# Patient Record
Sex: MALE | Race: WHITE | NOT HISPANIC OR LATINO | Employment: OTHER | URBAN - METROPOLITAN AREA
[De-identification: names, ages, dates, MRNs, and addresses within clinical notes are randomized per-mention and may not be internally consistent; named-entity substitution may affect disease eponyms.]

---

## 2023-01-20 ENCOUNTER — TELEPHONE (OUTPATIENT)
Dept: HEPATOLOGY | Facility: CLINIC | Age: 68
End: 2023-01-20

## 2023-01-20 NOTE — TELEPHONE ENCOUNTER
Spoke with patient wife, Alla, to get patient scheduled for an appointment with Dr. Lisa Jones. Wife requested to call her back on Wednesday, 01/25/2023 to schedule an appointment.     She states that the patient is scheduled for a heat ablation on Monday, 01/23, and would like to get that out of the way first.     Verbalized understanding with wife and will call back next week to schedule an appointment.

## 2023-02-10 ENCOUNTER — TELEPHONE (OUTPATIENT)
Dept: HEPATOLOGY | Facility: CLINIC | Age: 68
End: 2023-02-10

## 2023-02-10 NOTE — TELEPHONE ENCOUNTER
Spoke with patient and offered to schedule an appointment. Patient states that he needs to speak with his wife this evening and call back to schedule.     Verbalized understanding with patient.

## 2023-02-21 ENCOUNTER — TELEPHONE (OUTPATIENT)
Dept: HEPATOLOGY | Facility: CLINIC | Age: 68
End: 2023-02-21
Payer: MEDICARE

## 2023-02-21 NOTE — TELEPHONE ENCOUNTER
Spoke with patient and his wife and scheduled an appointment for 2/27/2023 with Dr. Lisa Jones at 0800.

## 2023-04-19 ENCOUNTER — OFFICE VISIT (OUTPATIENT)
Dept: HEPATOLOGY | Facility: CLINIC | Age: 68
End: 2023-04-19
Payer: MEDICARE

## 2023-04-19 VITALS — WEIGHT: 211.44 LBS | SYSTOLIC BLOOD PRESSURE: 142 MMHG | HEART RATE: 64 BPM | DIASTOLIC BLOOD PRESSURE: 72 MMHG

## 2023-04-19 DIAGNOSIS — D84.9 IMMUNOSUPPRESSION: Primary | ICD-10-CM

## 2023-04-19 DIAGNOSIS — Z94.4 LIVER TRANSPLANTED: ICD-10-CM

## 2023-04-19 PROCEDURE — 99204 OFFICE O/P NEW MOD 45 MIN: CPT | Mod: S$PBB,,, | Performed by: INTERNAL MEDICINE

## 2023-04-19 PROCEDURE — 99999 PR PBB SHADOW E&M-EST. PATIENT-LVL IV: CPT | Mod: PBBFAC,,, | Performed by: INTERNAL MEDICINE

## 2023-04-19 PROCEDURE — 99999 PR PBB SHADOW E&M-EST. PATIENT-LVL IV: ICD-10-PCS | Mod: PBBFAC,,, | Performed by: INTERNAL MEDICINE

## 2023-04-19 PROCEDURE — 99204 PR OFFICE/OUTPT VISIT, NEW, LEVL IV, 45-59 MIN: ICD-10-PCS | Mod: S$PBB,,, | Performed by: INTERNAL MEDICINE

## 2023-04-19 PROCEDURE — 99214 OFFICE O/P EST MOD 30 MIN: CPT | Mod: PBBFAC | Performed by: INTERNAL MEDICINE

## 2023-04-19 RX ORDER — SULFAMETHOXAZOLE AND TRIMETHOPRIM 800; 160 MG/1; MG/1
1 TABLET ORAL 2 TIMES DAILY
COMMUNITY
End: 2023-04-19 | Stop reason: ALTCHOICE

## 2023-04-19 RX ORDER — MELOXICAM 15 MG/1
TABLET ORAL
COMMUNITY
Start: 2023-02-17

## 2023-04-19 RX ORDER — AMIODARONE HYDROCHLORIDE 200 MG/1
400 TABLET ORAL
COMMUNITY

## 2023-04-19 RX ORDER — AMLODIPINE BESYLATE 5 MG/1
TABLET ORAL
COMMUNITY
Start: 2023-03-22

## 2023-04-19 RX ORDER — METOPROLOL SUCCINATE 25 MG/1
TABLET, EXTENDED RELEASE ORAL
COMMUNITY
Start: 2023-03-20

## 2023-04-19 RX ORDER — AMITRIPTYLINE HYDROCHLORIDE 75 MG/1
TABLET ORAL
COMMUNITY
Start: 2023-03-15

## 2023-04-19 RX ORDER — HYDROCODONE BITARTRATE AND ACETAMINOPHEN 10; 325 MG/1; MG/1
TABLET ORAL
COMMUNITY

## 2023-04-19 RX ORDER — APIXABAN 5 MG/1
TABLET, FILM COATED ORAL
COMMUNITY
Start: 2022-10-24

## 2023-04-19 RX ORDER — FLUTICASONE PROPIONATE 50 MCG
SPRAY, SUSPENSION (ML) NASAL
COMMUNITY
Start: 2023-03-15

## 2023-04-19 RX ORDER — PANTOPRAZOLE SODIUM 40 MG/1
TABLET, DELAYED RELEASE ORAL
COMMUNITY
Start: 2023-03-20

## 2023-04-19 RX ORDER — PRAVASTATIN SODIUM 20 MG/1
TABLET ORAL
COMMUNITY
Start: 2023-03-20

## 2023-04-19 RX ORDER — TACROLIMUS 1 MG/1
1 CAPSULE ORAL
COMMUNITY

## 2023-04-19 NOTE — PROGRESS NOTES
Subjective:     Calixto Navarro Jr. is here for evaluation of Liver Transplant Follow-up      HPI  Calixto Navarro Jr. to establish care with a new transplant center.  He is not been seen regularly for transplant in many years.  He has also been decreasing his immunosuppression on his own and for at least 3-4 years has only been taking 1 mg of tacrolimus 3 times a week.  He also for some reason has been on Bactrim for many years.  Denies any history of infections.  He does report he gets his labs every 3 months denies any significant issues.  Overall he has been doing well and feeling well.  He retired in 2019.  He reports after transplant he had to have 3 other surgeries very soon after his transplant but after that no major issues.  No significant problems with rejection.    He does see a dermatologist every 6 months.    S/p liver transplant at [facility] 8/2001    Review of Systems    Objective:     Physical Exam  Vitals reviewed.   Constitutional:       General: He is not in acute distress.     Appearance: He is well-developed.   HENT:      Head: Normocephalic and atraumatic.      Mouth/Throat:      Pharynx: No oropharyngeal exudate.   Eyes:      General: No scleral icterus.        Right eye: No discharge.         Left eye: No discharge.      Conjunctiva/sclera: Conjunctivae normal.      Pupils: Pupils are equal, round, and reactive to light.   Pulmonary:      Effort: Pulmonary effort is normal. No respiratory distress.      Breath sounds: Normal breath sounds. No wheezing.   Abdominal:      General: There is no distension.      Palpations: Abdomen is soft.      Tenderness: There is no abdominal tenderness.   Neurological:      Mental Status: He is alert and oriented to person, place, and time.   Psychiatric:         Behavior: Behavior normal.       Computed MELD-Na score unavailable. Necessary lab results were not found in the last year.  Computed MELD score unavailable. Necessary lab results were not found  in the last year.    No results found for: WBC, HGB, HCT, PLT  No results found for: BUN, CREATININE, GLU, CALCIUM, NA, K, CL, MG  No results found for: AST, ALT, ALKPHOS, BILITOT, ALBUMIN  No results found for: INR      Assessment/Plan:     1. Immunosuppression    2. Liver transplanted      Calixto Navarro Jr. is a 67 y.o. male withLiver Transplant Follow-up    Immunosuppression-expressed with patient and wife the unusual nature of his tacrolimus level.  They have been very adamant about decreasing his immunosuppression and his wife has 1 him to stop immunosuppression altogether.  I did explain that I do not advise stopping immunosuppression as he would be at risk of rejection.  Given that he has been apparently stable with his current dosing of tacrolimus will continue this for now.  He does get labs every 3 months by another provider.  Wife reports that she will have these faxed to us for review.  -need to see recent transplant labs  -     Comprehensive Metabolic Panel; Standing  -     CBC Auto Differential; Standing  -     Tacrolimus Level; Standing  -     US Doppler Liver Transplant Post (xpd); Future; Expected date: 04/19/2023    Liver transplanted-appears to have good allograft function  -would like to get updated imaging and staging given how far patient's out from transplant  -continue with yearly dermatologic follow-up  -I will continue to follow this patient and I do not think there is any benefit at this time to transferring her to the transplant service  -discontinue Bactrim as is no indication and concerned about risk of resistance among other complications  -     US Elastography Liver w/imaging; Future; Expected date: 04/19/2023  -     Comprehensive Metabolic Panel; Standing  -     CBC Auto Differential; Standing  -     Tacrolimus Level; Standing  -     US Doppler Liver Transplant Post (xpd); Future; Expected date: 04/19/2023      Return to clinic in 1 year    Lisa Jones MD     Addendum      Outside records from February 2023 show white blood cell count 7.3, hemoglobin 14.2, platelet count 232, creatinine 0.88, sodium 142, potassium 4.2, albumin 4.4, AST 28, ALT 34, total cholesterol 147, triglycerides 90, hemoglobin A1c 5.9, tacrolimus level 7.7

## 2023-05-11 ENCOUNTER — HOSPITAL ENCOUNTER (OUTPATIENT)
Dept: RADIOLOGY | Facility: HOSPITAL | Age: 68
Discharge: HOME | End: 2023-05-11
Attending: INTERNAL MEDICINE
Payer: MEDICARE

## 2023-05-11 ENCOUNTER — PROCEDURE VISIT (OUTPATIENT)
Dept: HEPATOLOGY | Facility: CLINIC | Age: 68
End: 2023-05-11
Attending: INTERNAL MEDICINE
Payer: MEDICARE

## 2023-05-11 VITALS — WEIGHT: 214.5 LBS | HEIGHT: 77 IN | BODY MASS INDEX: 25.33 KG/M2

## 2023-05-11 DIAGNOSIS — Z94.4 LIVER TRANSPLANTED: ICD-10-CM

## 2023-05-11 DIAGNOSIS — D84.9 IMMUNOSUPPRESSION: ICD-10-CM

## 2023-05-11 PROCEDURE — 76705 ECHO EXAM OF ABDOMEN: CPT | Mod: 26,XS,, | Performed by: RADIOLOGY

## 2023-05-11 PROCEDURE — 93976 VASCULAR STUDY: CPT | Mod: TC

## 2023-05-11 PROCEDURE — 76981 PR US, ELASTOGRAPHY, PARENCHYMA: ICD-10-PCS | Mod: 26,S$PBB,, | Performed by: NURSE PRACTITIONER

## 2023-05-11 PROCEDURE — 76981 USE PARENCHYMA: CPT | Mod: 26,S$PBB,, | Performed by: NURSE PRACTITIONER

## 2023-05-11 PROCEDURE — 76981 USE PARENCHYMA: CPT | Mod: PBBFAC | Performed by: NURSE PRACTITIONER

## 2023-05-11 PROCEDURE — 93976 VASCULAR STUDY: CPT | Mod: 26,,, | Performed by: RADIOLOGY

## 2023-05-11 PROCEDURE — 76705 US DOPPLER LIVER TRANSPLANT POST (XPD): ICD-10-PCS | Mod: 26,XS,, | Performed by: RADIOLOGY

## 2023-05-11 PROCEDURE — 93976 US DOPPLER LIVER TRANSPLANT POST (XPD): ICD-10-PCS | Mod: 26,,, | Performed by: RADIOLOGY

## 2023-05-11 NOTE — PROGRESS NOTES
Patient presented in clinic today for fibroscan examination of their liver. Patient verbalized that they have fasted 4 hours prior to their examination. Patient denies having any active implantable metal devices; such as a pacemaker, defibrillator, or pump.     LYNN FonsecaN, RN   Registered Nurse Lead- Hepatology   [facility] [place]

## 2023-05-11 NOTE — PROCEDURES
Fibroscan Procedure     Name: Calixto Navarro   Date of Procedure : 2023   :: SHREYAS Delgado  Diagnosis: Transplant    Probe: M    Fibroscan readin.8 KPa    Fibrosis:F 0-1     CAP readin dB/m    Steatosis: :S2       Interpretation:   Moderate steatosis without fibrosis

## 2024-07-18 ENCOUNTER — HOSPITAL ENCOUNTER (INPATIENT)
Facility: HOSPITAL | Age: 69
LOS: 3 days | Discharge: HOME | DRG: 389 | End: 2024-07-21
Attending: EMERGENCY MEDICINE | Admitting: STUDENT IN AN ORGANIZED HEALTH CARE EDUCATION/TRAINING PROGRAM
Payer: MEDICARE

## 2024-07-18 DIAGNOSIS — R00.0 TACHYCARDIA: ICD-10-CM

## 2024-07-18 DIAGNOSIS — K56.609 SBO (SMALL BOWEL OBSTRUCTION): ICD-10-CM

## 2024-07-18 DIAGNOSIS — K56.609 SMALL BOWEL OBSTRUCTION: ICD-10-CM

## 2024-07-18 DIAGNOSIS — R57.9 SHOCK CIRCULATORY: ICD-10-CM

## 2024-07-18 DIAGNOSIS — Z94.4 S/P LIVER TRANSPLANT: Primary | Chronic | ICD-10-CM

## 2024-07-18 PROBLEM — I48.0 PAROXYSMAL A-FIB: Status: ACTIVE | Noted: 2024-07-18

## 2024-07-18 LAB
ABO + RH BLD: NORMAL
ALBUMIN SERPL BCP-MCNC: 2.1 G/DL (ref 3.5–5.2)
ALBUMIN SERPL BCP-MCNC: 2.3 G/DL (ref 3.5–5.2)
ALP SERPL-CCNC: 216 U/L (ref 55–135)
ALP SERPL-CCNC: 243 U/L (ref 55–135)
ALT SERPL W/O P-5'-P-CCNC: 79 U/L (ref 10–44)
ALT SERPL W/O P-5'-P-CCNC: 89 U/L (ref 10–44)
ANION GAP SERPL CALC-SCNC: 13 MMOL/L (ref 8–16)
ANION GAP SERPL CALC-SCNC: 14 MMOL/L (ref 8–16)
ASCENDING AORTA: 3.1 CM
AST SERPL-CCNC: 65 U/L (ref 10–40)
AST SERPL-CCNC: 74 U/L (ref 10–40)
AV INDEX (PROSTH): 0.48
AV MEAN GRADIENT: 10 MMHG
AV PEAK GRADIENT: 15 MMHG
AV VALVE AREA BY VELOCITY RATIO: 1.39 CM²
AV VALVE AREA: 1.49 CM²
AV VELOCITY RATIO: 0.44
BASOPHILS # BLD AUTO: 0.01 K/UL (ref 0–0.2)
BASOPHILS # BLD AUTO: 0.09 K/UL (ref 0–0.2)
BASOPHILS NFR BLD: 0.1 % (ref 0–1.9)
BASOPHILS NFR BLD: 0.5 % (ref 0–1.9)
BILIRUB SERPL-MCNC: 1.3 MG/DL (ref 0.1–1)
BILIRUB SERPL-MCNC: 1.6 MG/DL (ref 0.1–1)
BLD GP AB SCN CELLS X3 SERPL QL: NORMAL
BSA FOR ECHO PROCEDURE: 2.24 M2
BUN SERPL-MCNC: 41 MG/DL (ref 8–23)
BUN SERPL-MCNC: 42 MG/DL (ref 8–23)
CA-I BLDV-SCNC: 1.02 MMOL/L (ref 1.06–1.42)
CALCIUM SERPL-MCNC: 8.5 MG/DL (ref 8.7–10.5)
CALCIUM SERPL-MCNC: 8.5 MG/DL (ref 8.7–10.5)
CHLORIDE SERPL-SCNC: 97 MMOL/L (ref 95–110)
CHLORIDE SERPL-SCNC: 98 MMOL/L (ref 95–110)
CO2 SERPL-SCNC: 18 MMOL/L (ref 23–29)
CO2 SERPL-SCNC: 20 MMOL/L (ref 23–29)
CREAT SERPL-MCNC: 2 MG/DL (ref 0.5–1.4)
CREAT SERPL-MCNC: 2.5 MG/DL (ref 0.5–1.4)
CV ECHO LV RWT: 0.43 CM
DIFFERENTIAL METHOD BLD: ABNORMAL
DIFFERENTIAL METHOD BLD: ABNORMAL
DOP CALC AO PEAK VEL: 1.96 M/S
DOP CALC AO VTI: 30.29 CM
DOP CALC LVOT AREA: 3.1 CM2
DOP CALC LVOT DIAMETER: 2 CM
DOP CALC LVOT PEAK VEL: 0.87 M/S
DOP CALC LVOT STROKE VOLUME: 45.18 CM3
DOP CALC MV VTI: 38.16 CM
DOP CALCLVOT PEAK VEL VTI: 14.39 CM
E WAVE DECELERATION TIME: 453.51 MSEC
E/A RATIO: 1.15
E/E' RATIO: 18.47 M/S
ECHO LV POSTERIOR WALL: 1.12 CM (ref 0.6–1.1)
EOSINOPHIL # BLD AUTO: 0 K/UL (ref 0–0.5)
EOSINOPHIL # BLD AUTO: 0.1 K/UL (ref 0–0.5)
EOSINOPHIL NFR BLD: 0.2 % (ref 0–8)
EOSINOPHIL NFR BLD: 0.4 % (ref 0–8)
ERYTHROCYTE [DISTWIDTH] IN BLOOD BY AUTOMATED COUNT: 13.6 % (ref 11.5–14.5)
ERYTHROCYTE [DISTWIDTH] IN BLOOD BY AUTOMATED COUNT: 13.8 % (ref 11.5–14.5)
EST. GFR  (NO RACE VARIABLE): 27.5 ML/MIN/1.73 M^2
EST. GFR  (NO RACE VARIABLE): 35.9 ML/MIN/1.73 M^2
FRACTIONAL SHORTENING: 43 % (ref 28–44)
GLUCOSE SERPL-MCNC: 102 MG/DL (ref 70–110)
GLUCOSE SERPL-MCNC: 105 MG/DL (ref 70–110)
HCT VFR BLD AUTO: 36 % (ref 40–54)
HCT VFR BLD AUTO: 40.6 % (ref 40–54)
HGB BLD-MCNC: 11.9 G/DL (ref 14–18)
HGB BLD-MCNC: 13.2 G/DL (ref 14–18)
HR MV ECHO: 102 BPM
IMM GRANULOCYTES # BLD AUTO: 0.48 K/UL (ref 0–0.04)
IMM GRANULOCYTES # BLD AUTO: 0.74 K/UL (ref 0–0.04)
IMM GRANULOCYTES NFR BLD AUTO: 2.8 % (ref 0–0.5)
IMM GRANULOCYTES NFR BLD AUTO: 4.9 % (ref 0–0.5)
INR PPP: 1.4 (ref 0.8–1.2)
INTERVENTRICULAR SEPTUM: 1.24 CM (ref 0.6–1.1)
LA MAJOR: 6.2 CM
LA WIDTH: 3.62 CM
LACTATE SERPL-SCNC: 1.9 MMOL/L (ref 0.5–2.2)
LEFT ATRIUM SIZE: 5.23 CM
LEFT INTERNAL DIMENSION IN SYSTOLE: 2.94 CM (ref 2.1–4)
LEFT VENTRICLE DIASTOLIC VOLUME INDEX: 57.77 ML/M2
LEFT VENTRICLE DIASTOLIC VOLUME: 127.67 ML
LEFT VENTRICLE MASS INDEX: 109 G/M2
LEFT VENTRICLE SYSTOLIC VOLUME INDEX: 15.1 ML/M2
LEFT VENTRICLE SYSTOLIC VOLUME: 33.39 ML
LEFT VENTRICULAR INTERNAL DIMENSION IN DIASTOLE: 5.17 CM (ref 3.5–6)
LEFT VENTRICULAR MASS: 240.86 G
LV LATERAL E/E' RATIO: 15.7 M/S
LV SEPTAL E/E' RATIO: 22.43 M/S
LYMPHOCYTES # BLD AUTO: 0.4 K/UL (ref 1–4.8)
LYMPHOCYTES # BLD AUTO: 0.6 K/UL (ref 1–4.8)
LYMPHOCYTES NFR BLD: 2.6 % (ref 18–48)
LYMPHOCYTES NFR BLD: 3.8 % (ref 18–48)
MAGNESIUM SERPL-MCNC: 1.4 MG/DL (ref 1.6–2.6)
MAGNESIUM SERPL-MCNC: 1.4 MG/DL (ref 1.6–2.6)
MCH RBC QN AUTO: 32.4 PG (ref 27–31)
MCH RBC QN AUTO: 32.6 PG (ref 27–31)
MCHC RBC AUTO-ENTMCNC: 32.5 G/DL (ref 32–36)
MCHC RBC AUTO-ENTMCNC: 33.1 G/DL (ref 32–36)
MCV RBC AUTO: 100 FL (ref 82–98)
MCV RBC AUTO: 99 FL (ref 82–98)
MONOCYTES # BLD AUTO: 0.9 K/UL (ref 0.3–1)
MONOCYTES # BLD AUTO: 1.3 K/UL (ref 0.3–1)
MONOCYTES NFR BLD: 5.7 % (ref 4–15)
MONOCYTES NFR BLD: 7.5 % (ref 4–15)
MV MEAN GRADIENT: 5 MMHG
MV PEAK A VEL: 1.36 M/S
MV PEAK E VEL: 1.57 M/S
MV PEAK GRADIENT: 9 MMHG
MV STENOSIS PRESSURE HALF TIME: 131.52 MS
MV VALVE AREA BY CONTINUITY EQUATION: 1.18 CM2
MV VALVE AREA P 1/2 METHOD: 1.67 CM2
NEUTROPHILS # BLD AUTO: 12.9 K/UL (ref 1.8–7.7)
NEUTROPHILS # BLD AUTO: 14.6 K/UL (ref 1.8–7.7)
NEUTROPHILS NFR BLD: 85.1 % (ref 38–73)
NEUTROPHILS NFR BLD: 86.4 % (ref 38–73)
NRBC BLD-RTO: 0 /100 WBC
NRBC BLD-RTO: 0 /100 WBC
PHOSPHATE SERPL-MCNC: 4 MG/DL (ref 2.7–4.5)
PHOSPHATE SERPL-MCNC: 4.8 MG/DL (ref 2.7–4.5)
PLATELET # BLD AUTO: 119 K/UL (ref 150–450)
PLATELET # BLD AUTO: 96 K/UL (ref 150–450)
PLATELET BLD QL SMEAR: ABNORMAL
PMV BLD AUTO: 10.4 FL (ref 9.2–12.9)
PMV BLD AUTO: 10.6 FL (ref 9.2–12.9)
POTASSIUM SERPL-SCNC: 5.1 MMOL/L (ref 3.5–5.1)
POTASSIUM SERPL-SCNC: 5.1 MMOL/L (ref 3.5–5.1)
PROT SERPL-MCNC: 5.9 G/DL (ref 6–8.4)
PROT SERPL-MCNC: 6.4 G/DL (ref 6–8.4)
PROTHROMBIN TIME: 15.2 SEC (ref 9–12.5)
RA MAJOR: 4.71 CM
RA PRESSURE ESTIMATED: 3 MMHG
RA WIDTH: 3.34 CM
RBC # BLD AUTO: 3.65 M/UL (ref 4.6–6.2)
RBC # BLD AUTO: 4.07 M/UL (ref 4.6–6.2)
RIGHT VENTRICLE DIASTOLIC BASEL DIMENSION: 3 CM
SINUS: 2.94 CM
SODIUM SERPL-SCNC: 129 MMOL/L (ref 136–145)
SODIUM SERPL-SCNC: 131 MMOL/L (ref 136–145)
SPECIMEN OUTDATE: NORMAL
STJ: 2.77 CM
TDI LATERAL: 0.1 M/S
TDI SEPTAL: 0.07 M/S
TDI: 0.09 M/S
TRICUSPID ANNULAR PLANE SYSTOLIC EXCURSION: 1.98 CM
WBC # BLD AUTO: 15.09 K/UL (ref 3.9–12.7)
WBC # BLD AUTO: 16.86 K/UL (ref 3.9–12.7)
Z-SCORE OF LEFT VENTRICULAR DIMENSION IN END DIASTOLE: -3.89
Z-SCORE OF LEFT VENTRICULAR DIMENSION IN END SYSTOLE: -3.6

## 2024-07-18 PROCEDURE — 25000003 PHARM REV CODE 250: Performed by: EMERGENCY MEDICINE

## 2024-07-18 PROCEDURE — 63600175 PHARM REV CODE 636 W HCPCS: Mod: JZ,JG

## 2024-07-18 PROCEDURE — 96365 THER/PROPH/DIAG IV INF INIT: CPT

## 2024-07-18 PROCEDURE — 85025 COMPLETE CBC W/AUTO DIFF WBC: CPT | Performed by: STUDENT IN AN ORGANIZED HEALTH CARE EDUCATION/TRAINING PROGRAM

## 2024-07-18 PROCEDURE — 84100 ASSAY OF PHOSPHORUS: CPT | Mod: 91

## 2024-07-18 PROCEDURE — 25500020 PHARM REV CODE 255: Performed by: STUDENT IN AN ORGANIZED HEALTH CARE EDUCATION/TRAINING PROGRAM

## 2024-07-18 PROCEDURE — 25000003 PHARM REV CODE 250

## 2024-07-18 PROCEDURE — 99223 1ST HOSP IP/OBS HIGH 75: CPT | Mod: AI,,, | Performed by: STUDENT IN AN ORGANIZED HEALTH CARE EDUCATION/TRAINING PROGRAM

## 2024-07-18 PROCEDURE — 63600175 PHARM REV CODE 636 W HCPCS: Performed by: STUDENT IN AN ORGANIZED HEALTH CARE EDUCATION/TRAINING PROGRAM

## 2024-07-18 PROCEDURE — 86900 BLOOD TYPING SEROLOGIC ABO: CPT

## 2024-07-18 PROCEDURE — 86850 RBC ANTIBODY SCREEN: CPT

## 2024-07-18 PROCEDURE — 86901 BLOOD TYPING SEROLOGIC RH(D): CPT

## 2024-07-18 PROCEDURE — 83735 ASSAY OF MAGNESIUM: CPT | Mod: 91

## 2024-07-18 PROCEDURE — 36620 INSERTION CATHETER ARTERY: CPT

## 2024-07-18 PROCEDURE — 82330 ASSAY OF CALCIUM: CPT

## 2024-07-18 PROCEDURE — 83605 ASSAY OF LACTIC ACID: CPT

## 2024-07-18 PROCEDURE — 63600175 PHARM REV CODE 636 W HCPCS

## 2024-07-18 PROCEDURE — 03HY32Z INSERTION OF MONITORING DEVICE INTO UPPER ARTERY, PERCUTANEOUS APPROACH: ICD-10-PCS | Performed by: SURGERY

## 2024-07-18 PROCEDURE — 83735 ASSAY OF MAGNESIUM: CPT | Performed by: STUDENT IN AN ORGANIZED HEALTH CARE EDUCATION/TRAINING PROGRAM

## 2024-07-18 PROCEDURE — 85610 PROTHROMBIN TIME: CPT

## 2024-07-18 PROCEDURE — 87040 BLOOD CULTURE FOR BACTERIA: CPT

## 2024-07-18 PROCEDURE — 94761 N-INVAS EAR/PLS OXIMETRY MLT: CPT

## 2024-07-18 PROCEDURE — 25000003 PHARM REV CODE 250: Mod: JZ,JG

## 2024-07-18 PROCEDURE — 63600175 PHARM REV CODE 636 W HCPCS: Performed by: EMERGENCY MEDICINE

## 2024-07-18 PROCEDURE — 63600175 PHARM REV CODE 636 W HCPCS: Mod: JG

## 2024-07-18 PROCEDURE — 99285 EMERGENCY DEPT VISIT HI MDM: CPT | Mod: 25

## 2024-07-18 PROCEDURE — 36415 COLL VENOUS BLD VENIPUNCTURE: CPT | Performed by: STUDENT IN AN ORGANIZED HEALTH CARE EDUCATION/TRAINING PROGRAM

## 2024-07-18 PROCEDURE — 84100 ASSAY OF PHOSPHORUS: CPT | Performed by: STUDENT IN AN ORGANIZED HEALTH CARE EDUCATION/TRAINING PROGRAM

## 2024-07-18 PROCEDURE — 80053 COMPREHEN METABOLIC PANEL: CPT | Performed by: STUDENT IN AN ORGANIZED HEALTH CARE EDUCATION/TRAINING PROGRAM

## 2024-07-18 PROCEDURE — 85025 COMPLETE CBC W/AUTO DIFF WBC: CPT | Mod: 91

## 2024-07-18 PROCEDURE — 20000000 HC ICU ROOM

## 2024-07-18 PROCEDURE — 87154 CUL TYP ID BLD PTHGN 6+ TRGT: CPT

## 2024-07-18 PROCEDURE — 80053 COMPREHEN METABOLIC PANEL: CPT | Mod: 91

## 2024-07-18 RX ORDER — VASOPRESSIN 20 [USP'U]/ML
INJECTION, SOLUTION INTRAMUSCULAR; SUBCUTANEOUS
Status: COMPLETED
Start: 2024-07-18 | End: 2024-07-18

## 2024-07-18 RX ORDER — CALCIUM GLUCONATE 20 MG/ML
3 INJECTION, SOLUTION INTRAVENOUS
Status: DISCONTINUED | OUTPATIENT
Start: 2024-07-18 | End: 2024-07-21

## 2024-07-18 RX ORDER — METOPROLOL TARTRATE 1 MG/ML
5 INJECTION, SOLUTION INTRAVENOUS EVERY 6 HOURS
Status: DISCONTINUED | OUTPATIENT
Start: 2024-07-18 | End: 2024-07-18

## 2024-07-18 RX ORDER — METOPROLOL TARTRATE 1 MG/ML
5 INJECTION, SOLUTION INTRAVENOUS EVERY 12 HOURS
Status: DISCONTINUED | OUTPATIENT
Start: 2024-07-18 | End: 2024-07-18

## 2024-07-18 RX ORDER — HYDROMORPHONE HYDROCHLORIDE 1 MG/ML
1 INJECTION, SOLUTION INTRAMUSCULAR; INTRAVENOUS; SUBCUTANEOUS
Status: COMPLETED | OUTPATIENT
Start: 2024-07-18 | End: 2024-07-18

## 2024-07-18 RX ORDER — METOPROLOL TARTRATE 1 MG/ML
5 INJECTION, SOLUTION INTRAVENOUS EVERY 6 HOURS PRN
Status: DISCONTINUED | OUTPATIENT
Start: 2024-07-18 | End: 2024-07-21

## 2024-07-18 RX ORDER — ACETAMINOPHEN 10 MG/ML
1000 INJECTION, SOLUTION INTRAVENOUS ONCE
Status: COMPLETED | OUTPATIENT
Start: 2024-07-18 | End: 2024-07-18

## 2024-07-18 RX ORDER — MAGNESIUM SULFATE HEPTAHYDRATE 40 MG/ML
2 INJECTION, SOLUTION INTRAVENOUS
Status: DISCONTINUED | OUTPATIENT
Start: 2024-07-18 | End: 2024-07-21

## 2024-07-18 RX ORDER — PANTOPRAZOLE SODIUM 40 MG/10ML
40 INJECTION, POWDER, LYOPHILIZED, FOR SOLUTION INTRAVENOUS 2 TIMES DAILY
Status: DISCONTINUED | OUTPATIENT
Start: 2024-07-18 | End: 2024-07-21

## 2024-07-18 RX ORDER — SODIUM CHLORIDE 0.9 % (FLUSH) 0.9 %
10 SYRINGE (ML) INJECTION
Status: DISCONTINUED | OUTPATIENT
Start: 2024-07-18 | End: 2024-07-21

## 2024-07-18 RX ORDER — ENOXAPARIN SODIUM 100 MG/ML
40 INJECTION SUBCUTANEOUS EVERY 24 HOURS
Status: DISCONTINUED | OUTPATIENT
Start: 2024-07-18 | End: 2024-07-18

## 2024-07-18 RX ORDER — HEPARIN SODIUM 5000 [USP'U]/ML
5000 INJECTION, SOLUTION INTRAVENOUS; SUBCUTANEOUS EVERY 8 HOURS
Status: DISCONTINUED | OUTPATIENT
Start: 2024-07-18 | End: 2024-07-21 | Stop reason: HOSPADM

## 2024-07-18 RX ORDER — CALCIUM GLUCONATE 20 MG/ML
2 INJECTION, SOLUTION INTRAVENOUS
Status: DISCONTINUED | OUTPATIENT
Start: 2024-07-18 | End: 2024-07-21

## 2024-07-18 RX ORDER — ONDANSETRON HYDROCHLORIDE 2 MG/ML
4 INJECTION, SOLUTION INTRAVENOUS EVERY 8 HOURS PRN
Status: DISCONTINUED | OUTPATIENT
Start: 2024-07-18 | End: 2024-07-21

## 2024-07-18 RX ORDER — NOREPINEPHRINE BITARTRATE/D5W 4MG/250ML
0-3 PLASTIC BAG, INJECTION (ML) INTRAVENOUS CONTINUOUS
Status: DISCONTINUED | OUTPATIENT
Start: 2024-07-18 | End: 2024-07-19

## 2024-07-18 RX ORDER — FLUTICASONE FUROATE AND VILANTEROL 200; 25 UG/1; UG/1
1 POWDER RESPIRATORY (INHALATION) DAILY PRN
Status: DISCONTINUED | OUTPATIENT
Start: 2024-07-18 | End: 2024-07-21 | Stop reason: HOSPADM

## 2024-07-18 RX ORDER — POTASSIUM CHLORIDE 7.45 MG/ML
80 INJECTION INTRAVENOUS
Status: DISCONTINUED | OUTPATIENT
Start: 2024-07-18 | End: 2024-07-21

## 2024-07-18 RX ORDER — AMIODARONE HYDROCHLORIDE 150 MG/3ML
100 INJECTION, SOLUTION INTRAVENOUS DAILY
Status: DISCONTINUED | OUTPATIENT
Start: 2024-07-18 | End: 2024-07-18

## 2024-07-18 RX ORDER — SODIUM CHLORIDE 9 MG/ML
INJECTION, SOLUTION INTRAVENOUS CONTINUOUS
Status: DISCONTINUED | OUTPATIENT
Start: 2024-07-18 | End: 2024-07-20

## 2024-07-18 RX ORDER — SODIUM CHLORIDE, SODIUM LACTATE, POTASSIUM CHLORIDE, CALCIUM CHLORIDE 600; 310; 30; 20 MG/100ML; MG/100ML; MG/100ML; MG/100ML
INJECTION, SOLUTION INTRAVENOUS CONTINUOUS
Status: DISCONTINUED | OUTPATIENT
Start: 2024-07-18 | End: 2024-07-18

## 2024-07-18 RX ORDER — POTASSIUM CHLORIDE 7.45 MG/ML
60 INJECTION INTRAVENOUS
Status: DISCONTINUED | OUTPATIENT
Start: 2024-07-18 | End: 2024-07-21

## 2024-07-18 RX ORDER — SODIUM CHLORIDE 0.9 % (FLUSH) 0.9 %
10 SYRINGE (ML) INJECTION
Status: DISCONTINUED | OUTPATIENT
Start: 2024-07-18 | End: 2024-07-21 | Stop reason: HOSPADM

## 2024-07-18 RX ORDER — POTASSIUM CHLORIDE 7.45 MG/ML
40 INJECTION INTRAVENOUS
Status: DISCONTINUED | OUTPATIENT
Start: 2024-07-18 | End: 2024-07-21

## 2024-07-18 RX ORDER — LIDOCAINE HYDROCHLORIDE 10 MG/ML
1 INJECTION, SOLUTION EPIDURAL; INFILTRATION; INTRACAUDAL; PERINEURAL ONCE AS NEEDED
Status: DISCONTINUED | OUTPATIENT
Start: 2024-07-18 | End: 2024-07-21 | Stop reason: HOSPADM

## 2024-07-18 RX ORDER — HYDROMORPHONE HYDROCHLORIDE 1 MG/ML
0.5 INJECTION, SOLUTION INTRAMUSCULAR; INTRAVENOUS; SUBCUTANEOUS EVERY 4 HOURS PRN
Status: DISCONTINUED | OUTPATIENT
Start: 2024-07-18 | End: 2024-07-19

## 2024-07-18 RX ORDER — CALCIUM GLUCONATE 20 MG/ML
1 INJECTION, SOLUTION INTRAVENOUS
Status: DISCONTINUED | OUTPATIENT
Start: 2024-07-18 | End: 2024-07-21

## 2024-07-18 RX ORDER — HYDROMORPHONE HYDROCHLORIDE 1 MG/ML
0.5 INJECTION, SOLUTION INTRAMUSCULAR; INTRAVENOUS; SUBCUTANEOUS EVERY 6 HOURS PRN
Status: DISCONTINUED | OUTPATIENT
Start: 2024-07-18 | End: 2024-07-18

## 2024-07-18 RX ORDER — MAGNESIUM SULFATE HEPTAHYDRATE 40 MG/ML
4 INJECTION, SOLUTION INTRAVENOUS
Status: DISCONTINUED | OUTPATIENT
Start: 2024-07-18 | End: 2024-07-21

## 2024-07-18 RX ORDER — FLUTICASONE FUROATE AND VILANTEROL 200; 25 UG/1; UG/1
1 POWDER RESPIRATORY (INHALATION) DAILY
Status: DISCONTINUED | OUTPATIENT
Start: 2024-07-18 | End: 2024-07-18

## 2024-07-18 RX ADMIN — SODIUM CHLORIDE, POTASSIUM CHLORIDE, SODIUM LACTATE AND CALCIUM CHLORIDE 1000 ML: 600; 310; 30; 20 INJECTION, SOLUTION INTRAVENOUS at 06:07

## 2024-07-18 RX ADMIN — HYDROMORPHONE HYDROCHLORIDE 0.5 MG: 0.5 INJECTION, SOLUTION INTRAMUSCULAR; INTRAVENOUS; SUBCUTANEOUS at 11:07

## 2024-07-18 RX ADMIN — HYDROMORPHONE HYDROCHLORIDE 0.5 MG: 1 INJECTION, SOLUTION INTRAMUSCULAR; INTRAVENOUS; SUBCUTANEOUS at 04:07

## 2024-07-18 RX ADMIN — HEPARIN SODIUM 5000 UNITS: 5000 INJECTION INTRAVENOUS; SUBCUTANEOUS at 09:07

## 2024-07-18 RX ADMIN — HYDROMORPHONE HYDROCHLORIDE 1 MG: 1 INJECTION, SOLUTION INTRAMUSCULAR; INTRAVENOUS; SUBCUTANEOUS at 11:07

## 2024-07-18 RX ADMIN — SODIUM CHLORIDE: 9 INJECTION, SOLUTION INTRAVENOUS at 08:07

## 2024-07-18 RX ADMIN — SODIUM CHLORIDE, POTASSIUM CHLORIDE, SODIUM LACTATE AND CALCIUM CHLORIDE 1000 ML: 600; 310; 30; 20 INJECTION, SOLUTION INTRAVENOUS at 03:07

## 2024-07-18 RX ADMIN — ACETAMINOPHEN 1000 MG: 10 INJECTION, SOLUTION INTRAVENOUS at 05:07

## 2024-07-18 RX ADMIN — SODIUM CHLORIDE, POTASSIUM CHLORIDE, SODIUM LACTATE AND CALCIUM CHLORIDE: 600; 310; 30; 20 INJECTION, SOLUTION INTRAVENOUS at 01:07

## 2024-07-18 RX ADMIN — VASOPRESSIN 20 UNITS: 20 INJECTION INTRAVENOUS at 03:07

## 2024-07-18 RX ADMIN — NOREPINEPHRINE BITARTRATE 0.2 MCG/KG/MIN: 4 INJECTION, SOLUTION INTRAVENOUS at 10:07

## 2024-07-18 RX ADMIN — PIPERACILLIN SODIUM AND TAZOBACTAM SODIUM 4.5 G: 4; .5 INJECTION, POWDER, FOR SOLUTION INTRAVENOUS at 04:07

## 2024-07-18 RX ADMIN — SODIUM CHLORIDE, POTASSIUM CHLORIDE, SODIUM LACTATE AND CALCIUM CHLORIDE 1000 ML: 600; 310; 30; 20 INJECTION, SOLUTION INTRAVENOUS at 01:07

## 2024-07-18 RX ADMIN — PANTOPRAZOLE SODIUM 40 MG: 40 INJECTION, POWDER, FOR SOLUTION INTRAVENOUS at 08:07

## 2024-07-18 RX ADMIN — HUMAN ALBUMIN MICROSPHERES AND PERFLUTREN 0.66 MG: 10; .22 INJECTION, SOLUTION INTRAVENOUS at 04:07

## 2024-07-18 RX ADMIN — PIPERACILLIN SODIUM AND TAZOBACTAM SODIUM 4.5 G: 4; .5 INJECTION, POWDER, FOR SOLUTION INTRAVENOUS at 09:07

## 2024-07-18 RX ADMIN — VASOPRESSIN 0.04 UNITS/MIN: 20 INJECTION INTRAVENOUS at 10:07

## 2024-07-18 RX ADMIN — HYDROMORPHONE HYDROCHLORIDE 0.5 MG: 0.5 INJECTION, SOLUTION INTRAMUSCULAR; INTRAVENOUS; SUBCUTANEOUS at 08:07

## 2024-07-18 RX ADMIN — CALCIUM GLUCONATE 2 G: 20 INJECTION, SOLUTION INTRAVENOUS at 09:07

## 2024-07-18 NOTE — SUBJECTIVE & OBJECTIVE
Interval Hx 7/19/24: NAEON. Off of levo. Hemodynamics are within normal limits. Lactate down trending. Pt overall feeling well. NPO. UOP adequate.     No current facility-administered medications on file prior to encounter.     Current Outpatient Medications on File Prior to Encounter   Medication Sig    amiodarone (PACERONE) 200 MG Tab 400 mg.    amitriptyline (ELAVIL) 75 MG tablet Take by mouth.    amLODIPine (NORVASC) 5 MG tablet Take by mouth.    ELIQUIS 5 mg Tab Take by mouth.    fluticasone propionate (FLONASE) 50 mcg/actuation nasal spray by Each Nostril route.    HYDROcodone-acetaminophen (NORCO)  mg per tablet Take by mouth.    meloxicam (MOBIC) 15 MG tablet Take by mouth.    metoprolol succinate (TOPROL-XL) 25 MG 24 hr tablet Take by mouth.    pantoprazole (PROTONIX) 40 MG tablet Take by mouth.    pravastatin (PRAVACHOL) 20 MG tablet Take by mouth.    tacrolimus (PROGRAF) 1 MG Cap Take 1 mg by mouth.       Review of patient's allergies indicates:   Allergen Reactions    Gabapentin        Past Medical History:   Diagnosis Date    Afib     GERD (gastroesophageal reflux disease)     Hypertension      Past Surgical History:   Procedure Laterality Date    LIVER TRANSPLANT       Family History    None       Tobacco Use    Smoking status: Never    Smokeless tobacco: Never   Substance and Sexual Activity    Alcohol use: Not on file    Drug use: Not on file    Sexual activity: Not on file     Review of Systems   Constitutional:  Negative for chills and fever.   Respiratory:  Negative for cough and shortness of breath.    Cardiovascular:  Negative for chest pain.   Gastrointestinal:  Positive for abdominal pain, constipation, nausea and vomiting. Negative for diarrhea.   Genitourinary:  Negative for dysuria and hematuria.   Musculoskeletal:  Negative for back pain and myalgias.   Skin:  Negative for rash.   Neurological:  Negative for dizziness and headaches.   Psychiatric/Behavioral:  Negative for confusion.  The patient is not nervous/anxious.    All other systems reviewed and are negative.    Objective:     Vital Signs (Most Recent):  Temp: 98.2 °F (36.8 °C) (07/18/24 1330)  Pulse: 103 (07/18/24 1330)  Resp: 20 (07/18/24 1330)  BP: 109/62 (07/18/24 1330)  SpO2: (!) 92 % (07/18/24 1330) Vital Signs (24h Range):  Temp:  [97.9 °F (36.6 °C)-98.2 °F (36.8 °C)] 98.2 °F (36.8 °C)  Pulse:  [103-111] 103  Resp:  [18-22] 20  SpO2:  [89 %-93 %] 92 %  BP: (102-127)/(62-73) 109/62     Weight: 99.3 kg (218 lb 14.7 oz)  Body mass index is 29.69 kg/m².     Physical Exam  Vitals and nursing note reviewed.   Constitutional:       General: He is not in acute distress.     Appearance: He is not ill-appearing or diaphoretic.      Comments: 1L O2 via NC  NGT to LIWS    HENT:      Head: Normocephalic and atraumatic.      Mouth/Throat:      Mouth: Mucous membranes are dry.      Pharynx: Oropharynx is clear.   Eyes:      Extraocular Movements: Extraocular movements intact.      Conjunctiva/sclera: Conjunctivae normal.   Cardiovascular:      Rate and Rhythm: Tachycardia present.   Pulmonary:      Effort: Pulmonary effort is normal. No respiratory distress.   Abdominal:      Palpations: Abdomen is soft.      Tenderness: There is no guarding or rebound.      Comments: Well healed surgical scar noted  Distended with some firmness but no TTP to light or deep palpation. No peritonitic signs    Musculoskeletal:         General: No deformity.   Skin:     General: Skin is warm and dry.      Coloration: Skin is not jaundiced.   Neurological:      Mental Status: He is alert and oriented to person, place, and time.            I have reviewed all pertinent lab results within the past 24 hours.    Significant Diagnostics:  I have reviewed all pertinent imaging results/findings within the past 24 hours.

## 2024-07-18 NOTE — NURSING
Pt transferred to SICU RM [id number] from ED via stretcher and cardiac monitor. Charge nurse and SICU team notified and at bedside. Pt safetly transferred to SICU bed and connected to bedside monitor.  AAO x4, VSS, moves all extremities. Pt on levo @0.2. Vaso ordered. Labs ordered and completed. 1L LR bolus ordered and given. Bed in lowest position, side rails x2, call light within reach. All questions and concerns addressed at this time.

## 2024-07-18 NOTE — NURSING
Nurses Note -- 4 Eyes      7/18/2024   6:38 PM      Skin assessed during: Transfer      [x] No Altered Skin Integrity Present    []Prevention Measures Documented      [] Yes- Altered Skin Integrity Present or Discovered   [] LDA Added if Not in Epic (Describe Wound)   [] New Altered Skin Integrity was Present on Admit and Documented in LDA   [] Wound Image Taken    Wound Care Consulted? No    Attending Nurse:  RACHEL Layton     Second RN/Staff Member:   RACHEL Abdullahi

## 2024-07-18 NOTE — ED NOTES
Patient identifiers verified and correct for Calixto Navarro  LOC: The patient is awake, alert and aware of environment with an appropriate affect, the patient is oriented x 3 and speaking appropriately.   APPEARANCE: Patient appears comfortable and in no acute distress, patient is clean and well groomed.  SKIN: The skin is warm and dry, color consistent with ethnicity, patient has normal skin turgor and moist mucus membranes, skin intact, no breakdown or bruising noted.   MUSCULOSKELETAL: Patient moving all extremities spontaneously, no swelling noted.  RESPIRATORY: Airway is open and patent, respirations are spontaneous, patient has a normal effort and rate, no accessory muscle use noted, O2 Sat 97% on room air.  CARDIAC: Patient has a normal rate and regular rhythm, no edema noted, capillary refill < 3 seconds.   GASTRO: Soft and tender to palpation, distention noted, Pt states he has been passing gas. Pt has NG tube in place  : Pt denies any pain or frequency with urination.  NEURO: Pt opens eyes spontaneously, behavior appropriate to situation, follows commands, facial expression symmetrical, bilateral hand grasp equal and even, purposeful motor response noted, normal sensation in all extremities when touched with a finger.

## 2024-07-18 NOTE — H&P
[facility] - Emergency Dept  Critical Care - Surgery  History & Physical    Patient Name: Calixto Navarro Jr.  MRN: [id number]  Admission Date: 7/18/2024  Code Status: Full Code  Attending Physician: Reggie Guidry MD   Primary Care Provider: No, Primary Doctor   Principal Problem: SBO (small bowel obstruction)    Subjective:     HPI:  Patient is a 69yo male with PMHx ESLD s/p liver transplant in 2001, A fib s/p Watchman procedure (not currently on anticoagulation), GERD, HTN who presents with 2 days of abdominal pain, nausea, and vomiting. He began having bloating on Monday and felt constipated, but he had multiple loose BM after taking miralax and felt some relief. He has worsening distention, vomiting,and obstipation on Wednesday, so he presented to OSH where he was found to be hypotensive with CT findings concerning for SBO with evidence of pneumatosis and pneumobilia.     He had a liver transplant in 2001 that required takebacks x3 during that admission, but he has not had any issues since. He currently takes tacrolimus 1mg MWF. He denies any other surgical history. He is currently on aspirin 81mg daily but denies any anticoagulation.       Hospital/ICU Course:  No notes on file    Upon admission, patient is on 0.2 levo with MAPs in 80s, and he is satting well on room air. He denies any abdominal pain or nausea at this time, and he has an NGT to LIWS. His last BM was 2 days ago and was loose. He denies any hematemesis, hematochezia, or melena. He denies any recent weight loss or appetite changes.      Past Medical History:   Diagnosis Date    Afib     GERD (gastroesophageal reflux disease)     Hypertension        Past Surgical History:   Procedure Laterality Date    LIVER TRANSPLANT         Review of patient's allergies indicates:   Allergen Reactions    Gabapentin        Family History    None       Tobacco Use    Smoking status: Never    Smokeless tobacco: Never   Substance and Sexual Activity    Alcohol use:  Not on file    Drug use: Not on file    Sexual activity: Not on file      Review of Systems   Constitutional:  Negative for appetite change.   HENT: Negative.     Eyes: Negative.    Respiratory:  Negative for shortness of breath.    Cardiovascular: Negative.    Gastrointestinal:  Positive for abdominal distention, abdominal pain, nausea and vomiting.   Endocrine: Negative.    Genitourinary: Negative.    Musculoskeletal: Negative.    Neurological:  Positive for numbness.   Psychiatric/Behavioral: Negative.       Objective:     Vital Signs (Most Recent):  Temp: 97.9 °F (36.6 °C) (07/18/24 1052)  Pulse: 106 (07/18/24 1147)  Resp: 20 (07/18/24 1147)  BP: 102/63 (07/18/24 1147)  SpO2: (!) 93 % (07/18/24 1147) Vital Signs (24h Range):  Temp:  [97.9 °F (36.6 °C)] 97.9 °F (36.6 °C)  Pulse:  [106-111] 106  Resp:  [18-20] 20  SpO2:  [89 %-93 %] 93 %  BP: (102-127)/(63-73) 102/63     Weight: 99.3 kg (218 lb 14.7 oz)  Body mass index is 29.69 kg/m².    No intake or output data in the 24 hours ending 07/18/24 1315       Physical Exam  Vitals reviewed.   Constitutional:       General: He is not in acute distress.     Appearance: He is not toxic-appearing.   HENT:      Head: Normocephalic and atraumatic.      Nose:      Comments: NGT in place to LIWS with dark gastric contents output     Mouth/Throat:      Mouth: Mucous membranes are dry.      Pharynx: Oropharynx is clear.   Eyes:      Conjunctiva/sclera: Conjunctivae normal.   Cardiovascular:      Rate and Rhythm: Regular rhythm. Tachycardia present.   Pulmonary:      Effort: Pulmonary effort is normal. No respiratory distress.   Abdominal:      General: There is distension.      Tenderness: There is no abdominal tenderness. There is no guarding or rebound.      Comments: Abdomen distended and firm but non-tender to palpation diffusely, no evidence of guarding or peritonitis, well-healed incisions    Musculoskeletal:      Cervical back: Normal range of motion.      Right lower  leg: No edema.      Left lower leg: No edema.   Skin:     General: Skin is warm and dry.      Coloration: Skin is not jaundiced.   Neurological:      General: No focal deficit present.      Mental Status: He is alert. Mental status is at baseline.      Comments: Bilateral feet neuropathy            Vents:       Lines/Drains/Airways       Peripherally Inserted Central Catheter Line  Duration             PICC Double Lumen -- days              Peripheral Intravenous Line  Duration                  Peripheral IV - Single Lumen 07/18/24 18 G Right Antecubital <1 day         Peripheral IV - Single Lumen 07/18/24 20 G Left Antecubital <1 day                    Significant Labs:    CBC/Anemia Profile:  Recent Labs   Lab 07/18/24  1202   WBC 16.86*   HGB 13.2*   HCT 40.6   *   *   RDW 13.6        Chemistries:  Recent Labs   Lab 07/18/24  1202   *   K 5.1   CL 97   CO2 20*   BUN 42*   CREATININE 2.5*   CALCIUM 8.5*   ALBUMIN 2.3*   PROT 6.4   BILITOT 1.6*   ALKPHOS 243*   ALT 89*   AST 74*   MG 1.4*   PHOS 4.8*       All pertinent labs within the past 24 hours have been reviewed.    Significant Imaging: I have reviewed all pertinent imaging results/findings within the past 24 hours.  Assessment/Plan:     GI  * SBO (small bowel obstruction)  68m hx liver transplant 2001, AF s/p Watchman not on AC p/w 2d hx worsening nausea, vomiting, abdominal distention likely 2/2 SBO. General surgery consulted & following, pt admitted to SICU for stabilization and co-management.      Neuro/Psych:   -- Sedation:  none  -- Pain: 0.5mg IV dilaudid prn  -- Neurologically intact on admission             Cards:   -- Placed on 0.2 peripheral levo gtt in ED, MAP 80 on exam, wean as tolerated  -- Will place arterial line in SICU  -- Echo pending  -- EKG pending  -- Appears volume down, providing 1L LR bolus + continuous IVF  -- Hx AF s/p Watchman procedure, reports being off previously documented Eliquis for months; does take ASA  (held since 7/16)      Pulm:   -- Goal O2 sat > 90%, stable on RA      Renal:  -- BUN/Cr 2.5/27.5 on admission, likely prerenal ALMA DELIA in setting of 2d decreased PO intake + vomiting  -- Giving 1L LR bolus + continuous IVF resuscitation    Intake/Output - Last 3 Shifts       None            FEN / GI:   -- Replace lytes as needed  -- Nutrition: NPO  -- GI ppx: pantoprazole  -- Bowel reg: none  - SBO:  Abdomen distended, tender, but not peritonitic on exam  NG in place, flushed bedside  ACS following, low threshold for OR  -- Immunosuppression regimen PTA: 3mg tacrolimus QOD  -- CT abdomen @ OSH 7/18:  - Progressive pneumobilia. Density in the posterior right lobe of the liver with air bubbles suggestive of developing phlegmon/hepatic abscess.     - Low-grade partial or intermittent small bowel obstruction and mild sigmoid colitis.       ID:   -- Tm: afebrile; WBC 16.86 on admission      Heme/Onc:   -- H/H stable   -- Daily CBC  -- No evidence of active bleeding    Recent Labs   Lab 07/18/24  1202   WBC 16.86*   RBC 4.07*   HGB 13.2*   HCT 40.6   *   *   MCH 32.4*   MCHC 32.5           Endo:   -- Endocrine consulted, appreciate recs  -- BG goal 140-180      PPx:   Feeding: NPO  Analgesia/Sedation:  none  Thromboembolic prevention: SQH, SCD  HOB >30: y  Stress Ulcer ppx: pantoprazole  Glucose control: Critical care goal 140-180 g/dl, ISS    Lines/Drains/Airway: PIV x2      Dispo/Code Status/Palliative:   -- SICU / Full Code         Critical secondary to Patient has a condition that poses threat to life and bodily function: Small bowel obstruction     Critical care was time spent personally by me on the following activities: development of treatment plan with patient or surrogate and bedside caregivers, discussions with consultants, evaluation of patient's response to treatment, examination of patient, ordering and performing treatments and interventions, ordering and review of laboratory studies, ordering  and review of radiographic studies, pulse oximetry, re-evaluation of patient's condition.  This critical care time did not overlap with that of any other provider or involve time for any procedures.     Calixto Tovar MD  Critical Care - Surgery  [facility] - Emergency Dept

## 2024-07-18 NOTE — HPI
Patient is a 68 year old male with h/o ESLD s/p liver transplant in 2001, A fib s/p Watchman procedure (not currently on anticoagulation), GERD, HTN who was transferred here from OSH with 2 days of abdominal pain. He reports diffuse abdominal pain that worsened since onset with associated nausea and vomiting. Had diarrhea earlier in the week after miralax then developed constipation, bloating, and obstipation last night. Denies fever, chills, CP, SOB, and all other symptoms.     He presented to OSH ED and was found to be hypotensive. Given 2L NS and started on abx.   WBC 10.3 > 16  Lactic acid at OSH 2.3 > 2.5. Repeat here pending  CT a/p with concerning for SBO with evidence of pneumatosis and pneumobilia.     Upon eval in the ED, he is afebrile. Levo 0.2 via peripheral line on cuff pressures with MAPs in 80s. Denies abdominal pain and nausea.   NGT with ~150cc in bedside cannister

## 2024-07-18 NOTE — Clinical Note
Diagnosis: Small bowel obstruction [360845]   Future Attending Provider: ABEL CHO [[id number]]   Reason for IP Medical Treatment  (Clinical interventions that can only be accomplished in the IP setting? ) :: small bowel obstruction   I certify that Inpatient services for greater than or equal to 2 midnights are medically necessary:: No   Plans for Post-Acute care--if anticipated (pick the single best option):: A. No post acute care anticipated at this time

## 2024-07-18 NOTE — PROCEDURES
"Calixto Navarro Jr. is a 68 y.o. male patient.    Temp: 98.2 °F (36.8 °C) (07/18/24 1330)  Pulse: 103 (07/18/24 1330)  Resp: 20 (07/18/24 1330)  BP: 109/62 (07/18/24 1330)  SpO2: (!) 92 % (07/18/24 1330)  Weight: 99.3 kg (218 lb 14.7 oz) (07/18/24 1052)  Height: 6' (182.9 cm) (07/18/24 1052)       Arterial Line    Date/Time: 7/18/2024 3:04 PM  Location procedure was performed: [facility] SURGICAL ICU (SICU)    Performed by: Brittany Alvarenga MD  Authorized by: Brittany Alvarenga MD  Consent Done: Yes  Consent: Verbal consent obtained. Written consent obtained.  Risks and benefits: risks, benefits and alternatives were discussed  Consent given by: patient  Patient understanding: patient states understanding of the procedure being performed  Patient consent: the patient's understanding of the procedure matches consent given  Procedure consent: procedure consent matches procedure scheduled  Relevant documents: relevant documents present and verified  Patient identity confirmed: verbally with patient  Time out: Immediately prior to procedure a "time out" was called to verify the correct patient, procedure, equipment, support staff and site/side marked as required.  Preparation: Patient was prepped and draped in the usual sterile fashion.  Indications: hemodynamic monitoring  Location: left radial  Anesthesia: local infiltration    Anesthesia:  Local Anesthetic: lidocaine 1% without epinephrine  Anesthetic total: 2 mL  Number of attempts: 1  Complications: No  Post-procedure: line sutured and dressing applied  Post-procedure CMS: normal  Patient tolerance: Patient tolerated the procedure well with no immediate complications        Brittany Alvarenga MD  [facility] General Surgery PGY-2    7/18/2024    "

## 2024-07-18 NOTE — PLAN OF CARE
SICU PLAN OF CARE    Dx: SBO (small bowel obstruction)    Goals of Care: MAP >65    Vital Signs (last 12 hours):   Temp:  [97.9 °F (36.6 °C)-98.2 °F (36.8 °C)]   Pulse:  []   Resp:  [16-36]   BP: (102-127)/(62-73)   SpO2:  [89 %-99 %]   Arterial Line BP: (106-129)/(55-70)      Neuro: AAOx4, Follows commands , and Moves all extremities spontaneously     Cardiac: NSR    Respiratory:  1L Nasal Cannula     Gtts: Norepinephrine, Vasopressin, and MIVF    Urine Output: Voids Spontaneously  600 mL/shift    Diet: NPO      Labs/Accuchecks: CBC, CMP, mg, phos, lactic acid    Skin:  All skin remains free from injury.  Patient turned q2h, bony prominences protected, and mattress inflated/working correctly.     Shift Events: 2L LR given this shift. Plan  of care ongoing, VSS, bed in lowest position, call light within reach, side rails x2. See flowsheet for further assessment/details.  Family updated on current condition/plan of care, questions answered, and emotional support provided.  MD updated on current condition, vitals, labs, and gtts.

## 2024-07-18 NOTE — ED PROVIDER NOTES
Encounter Date: 7/18/2024       History     Chief Complaint   Patient presents with    Transfer from [facility] for surgical evaluation of possible     Transfer for possible SBO     Patient is a 67 yo M with PMH of  COPD, liver transplant who presents as a transfer from [facility] for general surgery  evaluation.   Patient presented to the outside hospital with 24 hours of nausea vomiting along with abdominal distention.  He states he was constipated and a taken laxatives at home and developed some diarrhea.  But then had distended abdomen it was not passing gas down but he was hypotensive at the outside hospital with a blood pressure of 65/36 and a heart rate of 107.  He was administered  2 L of normal saline, IV Protonix 40 mg, 1 g of ceftriaxone, 500 mg IV metronidazole, and Levaquin 500 mg     CT was concerning for portal venous and mesenteric venous gas as well as pneumatosis consistent with small bowel ischemia, small bowel distention     Per chart sent with patient patient received the Rocephin at 2:45 a.m. and and Levaquin was given at 5:50 a.m. and Flagyl given at 0 5:51      Lactic acid went from 2.3-2.5 this morning at 5:05 a.m.    The history is provided by the patient and medical records. The history is limited by the condition of the patient.     Review of patient's allergies indicates:   Allergen Reactions    Gabapentin      Past Medical History:   Diagnosis Date    Afib     GERD (gastroesophageal reflux disease)     Hypertension      Past Surgical History:   Procedure Laterality Date    LIVER TRANSPLANT       No family history on file.  Social History     Tobacco Use    Smoking status: Never    Smokeless tobacco: Never         Physical Exam     Initial Vitals [07/18/24 1052]   BP Pulse Resp Temp SpO2   127/73 110 20 97.9 °F (36.6 °C) (!) 89 %      MAP       --         Physical Exam    Nursing note and vitals reviewed.  Constitutional: He appears well-developed and well-nourished. He is not  diaphoretic. No distress.   HENT:   Head: Normocephalic and atraumatic.   Eyes: EOM are normal.   Neck: Neck supple.   Normal range of motion.  Cardiovascular:  Normal rate and regular rhythm.           Pulmonary/Chest: No respiratory distress.   Abdominal: Abdomen is soft. He exhibits distension. There is abdominal tenderness. There is no rebound and no guarding.   Musculoskeletal:         General: Normal range of motion.      Cervical back: Normal range of motion and neck supple.     Neurological: He is alert and oriented to person, place, and time. GCS score is 15. GCS eye subscore is 4. GCS verbal subscore is 5. GCS motor subscore is 6.   Skin: Skin is warm and dry.   Psychiatric: He has a normal mood and affect. His behavior is normal. Judgment and thought content normal.         ED Course   Procedures  Labs Reviewed   CBC W/ AUTO DIFFERENTIAL - Abnormal       Result Value    WBC 16.86 (*)     RBC 4.07 (*)     Hemoglobin 13.2 (*)     Hematocrit 40.6       (*)     MCH 32.4 (*)     MCHC 32.5      RDW 13.6      Platelets 119 (*)     MPV 10.4      Immature Granulocytes 2.8 (*)     Gran # (ANC) 14.6 (*)     Immature Grans (Abs) 0.48 (*)     Lymph # 0.4 (*)     Mono # 1.3 (*)     Eos # 0.0      Baso # 0.09      nRBC 0      Gran % 86.4 (*)     Lymph % 2.6 (*)     Mono % 7.5      Eosinophil % 0.2      Basophil % 0.5      Differential Method Automated     COMPREHENSIVE METABOLIC PANEL - Abnormal    Sodium 131 (*)     Potassium 5.1      Chloride 97      CO2 20 (*)     Glucose 105      BUN 42 (*)     Creatinine 2.5 (*)     Calcium 8.5 (*)     Total Protein 6.4      Albumin 2.3 (*)     Total Bilirubin 1.6 (*)     Alkaline Phosphatase 243 (*)     AST 74 (*)     ALT 89 (*)     eGFR 27.5 (*)     Anion Gap 14     MAGNESIUM - Abnormal    Magnesium 1.4 (*)    PHOSPHORUS - Abnormal    Phosphorus 4.8 (*)    LACTIC ACID, PLASMA          Imaging Results    None          Medications   LIDOcaine (PF) 10 mg/ml (1%) injection 10  mg (has no administration in time range)   sodium chloride 0.9% flush 10 mL (has no administration in time range)   sodium chloride 0.9% flush 10 mL (has no administration in time range)   potassium chloride 10 mEq in 100 mL IVPB (40 mEq Intravenous New Bag 7/20/24 0932)     And   potassium chloride 10 mEq in 100 mL IVPB (has no administration in time range)     And   potassium chloride 10 mEq in 100 mL IVPB (has no administration in time range)   magnesium sulfate 2g in water 50mL IVPB (premix) (0 g Intravenous Stopped 7/19/24 0843)   magnesium sulfate 2g in water 50mL IVPB (premix) (has no administration in time range)   sodium phosphate 15 mmol in D5W 250 mL IVPB (has no administration in time range)   sodium phosphate 20.01 mmol in D5W 250 mL IVPB (has no administration in time range)   sodium phosphate 30 mmol in D5W 250 mL IVPB (has no administration in time range)   calcium gluconate 1 g in NS IVPB (premixed) (has no administration in time range)   calcium gluconate 1 g in NS IVPB (premixed) (0 g Intravenous Stopped 7/18/24 2303)   calcium gluconate 1 g in NS IVPB (premixed) (has no administration in time range)   ondansetron injection 4 mg (has no administration in time range)   piperacillin-tazobactam (ZOSYN) 4.5 g in D5W 100 mL IVPB (MB+) ( Intravenous Verify Only 7/20/24 0904)   pantoprazole injection 40 mg (40 mg Intravenous Given 7/20/24 0815)   heparin (porcine) injection 5,000 Units (5,000 Units Subcutaneous Given 7/20/24 0611)   metoprolol injection 5 mg (has no administration in time range)   0.9%  NaCl infusion ( Intravenous Verify Only 7/20/24 0904)   tiotropium bromide 2.5 mcg/actuation inhaler 2 puff (2 puffs Inhalation Given 7/20/24 0747)   fluticasone furoate-vilanteroL 200-25 mcg/dose diskus inhaler 1 puff (has no administration in time range)   tacrolimus (PROGRAF) capsule (SUBLINGUAL) 0.5 mg (has no administration in time range)     And   tacrolimus (PROGRAF) capsule (SUBLINGUAL) 0.5 mg  (has no administration in time range)     And   tacrolimus (PROGRAF) capsule (SUBLINGUAL) 0.5 mg (has no administration in time range)   HYDROmorphone injection 0.5 mg (0.5 mg Intravenous Given 7/20/24 0611)   HYDROcodone-acetaminophen  mg per tablet 1 tablet (1 tablet Oral Given 7/20/24 0814)   HYDROmorphone injection 1 mg (1 mg Intravenous Given 7/18/24 1136)   lactated ringers bolus 1,000 mL (0 mLs Intravenous Stopped 7/18/24 1427)   lactated ringers bolus 1,000 mL (0 mLs Intravenous Stopped 7/18/24 1642)   vasopressin (PITRESSIN) 20 unit/mL injection (20 Units  Given 7/18/24 1554)   perflutren protein-A microsphr 0.22 mg/mL IV susp (0.66 mg Intravenous Given 7/18/24 1645)   acetaminophen 1,000 mg/100 mL (10 mg/mL) injection 1,000 mg (0 mg Intravenous Stopped 7/18/24 1806)   lactated ringers bolus 1,000 mL (0 mLs Intravenous Stopped 7/18/24 1946)   lactated ringers bolus 1,000 mL (0 mLs Intravenous Stopped 7/19/24 0851)   diatrizoate meglumineand-diatrizoate sodium (GASTROGRAFFIN) solution 100 mL (100 mLs Per NG tube Given 7/19/24 1012)     Medical Decision Making  Patient transferred for surgical evaluation of SBO and is on levophed, NG in place  Pt notes improvement in condition since NG tube being placed  Surgery evaluated patient and admitted to SICU    Risk  Prescription drug management.  Decision regarding hospitalization.                                      Clinical Impression:  Final diagnoses:  [K56.609] Small bowel obstruction          ED Disposition Condition    Admit                 Pattie Garcia MD  07/20/24 4782

## 2024-07-18 NOTE — HPI
Patient is a 67yo male with PMHx ESLD s/p liver transplant in 2001, A fib s/p Watchman procedure (not currently on anticoagulation), GERD, HTN who presents with 2 days of abdominal pain, nausea, and vomiting. He began having bloating on Monday and felt constipated, but he had multiple loose BM after taking miralax and felt some relief. He has worsening distention, vomiting,and obstipation on Wednesday, so he presented to OSH where he was found to be hypotensive with CT findings concerning for SBO with evidence of pneumatosis and pneumobilia.     He had a liver transplant in 2001 that required takebacks x3 during that admission, but he has not had any issues since. He currently takes tacrolimus 1mg MWF. He denies any other surgical history. He is currently on aspirin 81mg daily but denies any anticoagulation.

## 2024-07-18 NOTE — ASSESSMENT & PLAN NOTE
68 year old male with h/o ESLD s/p liver transplant in 2001, A fib s/p Watchman procedure (not currently on anticoagulation), GERD, HTN who was transferred here from OSH with 2 days of abdominal pain, concern for SBO. Hypotensive on arrival with concerning CT a/p, however, abdominal exam is reassuring and patient volume down on exam. Improving overall, labs reassuring, clinical picture reassuring.    - NPO, mIVF  - NGT to LIWS  - serial abdominal exams  - Broad spectrum abx (zosyn)  - daily labs  - KTM transplant for immunosuppression medication in setting of liver transplant  - Home meds reviewed and reconciled. Not taking amio or eliquis. Metoprolol converted to IV   - Dvt ppx (SCDs and heparin)  - Gi ppx (PPI)  - Remainder of care per SICU  - Please contact general surgery with any questions, concerns, or clinical status changes

## 2024-07-18 NOTE — ASSESSMENT & PLAN NOTE
68m hx liver transplant 2001, AF s/p Watchman not on AC p/w 2d hx worsening nausea, vomiting, abdominal distention likely 2/2 SBO. General surgery consulted & following, pt admitted to SICU for stabilization and co-management.      Neuro/Psych:   -- Sedation:  none  -- Pain: 0.5mg IV dilaudid prn  -- Neurologically intact on admission             Cards:   -- Placed on 0.2 peripheral levo gtt in ED, MAP 80 on exam, wean as tolerated  -- Will place arterial line in SICU  -- Echo pending  -- EKG pending  -- Appears volume down, providing 1L LR bolus + continuous IVF  -- Hx AF s/p Watchman procedure, reports being off previously documented Eliquis for months; does take ASA (held since 7/16)      Pulm:   -- Goal O2 sat > 90%, stable on RA      Renal:  -- BUN/Cr 2.5/27.5 on admission, likely prerenal ALMA DELIA in setting of 2d decreased PO intake + vomiting  -- Giving 1L LR bolus + continuous IVF resuscitation    Intake/Output - Last 3 Shifts       None                FEN / GI:   -- Replace lytes as needed  -- Nutrition: NPO  -- GI ppx: pantoprazole  -- Bowel reg: none  - SBO:  Abdomen distended, tender, but not peritonitic on exam  NG in place, flushed bedside  ACS following, low threshold for OR  -- Immunosuppression regimen PTA: 3mg tacrolimus QOD      ID:   -- Tm: afebrile; WBC 16.86 on admission      Heme/Onc:   -- H/H stable   -- Daily CBC  -- No evidence of active bleeding    Recent Labs   Lab 07/18/24  1202   WBC 16.86*   RBC 4.07*   HGB 13.2*   HCT 40.6   *   *   MCH 32.4*   MCHC 32.5           Endo:   -- Endocrine consulted, appreciate recs  -- BG goal 140-180      PPx:   Feeding: NPO  Analgesia/Sedation:  none  Thromboembolic prevention: SQH, SCD  HOB >30: y  Stress Ulcer ppx: pantoprazole  Glucose control: Critical care goal 140-180 g/dl, ISS    Lines/Drains/Airway: PIV x2      Dispo/Code Status/Palliative:   -- SICU / Full Code

## 2024-07-18 NOTE — SUBJECTIVE & OBJECTIVE
Upon admission, patient is on 0.2 levo with MAPs in 80s, and he is satting well on room air. He denies any abdominal pain or nausea at this time, and he has an NGT to LI. His last BM was 2 days ago and was loose. He denies any hematemesis, hematochezia, or melena. He denies any recent weight loss or appetite changes.      Past Medical History:   Diagnosis Date    Afib     GERD (gastroesophageal reflux disease)     Hypertension        Past Surgical History:   Procedure Laterality Date    LIVER TRANSPLANT         Review of patient's allergies indicates:   Allergen Reactions    Gabapentin        Family History    None       Tobacco Use    Smoking status: Never    Smokeless tobacco: Never   Substance and Sexual Activity    Alcohol use: Not on file    Drug use: Not on file    Sexual activity: Not on file      Review of Systems   Constitutional:  Negative for appetite change.   HENT: Negative.     Eyes: Negative.    Respiratory:  Negative for shortness of breath.    Cardiovascular: Negative.    Gastrointestinal:  Positive for abdominal distention, abdominal pain, nausea and vomiting.   Endocrine: Negative.    Genitourinary: Negative.    Musculoskeletal: Negative.    Neurological:  Positive for numbness.   Psychiatric/Behavioral: Negative.       Objective:     Vital Signs (Most Recent):  Temp: 97.9 °F (36.6 °C) (07/18/24 1052)  Pulse: 106 (07/18/24 1147)  Resp: 20 (07/18/24 1147)  BP: 102/63 (07/18/24 1147)  SpO2: (!) 93 % (07/18/24 1147) Vital Signs (24h Range):  Temp:  [97.9 °F (36.6 °C)] 97.9 °F (36.6 °C)  Pulse:  [106-111] 106  Resp:  [18-20] 20  SpO2:  [89 %-93 %] 93 %  BP: (102-127)/(63-73) 102/63     Weight: 99.3 kg (218 lb 14.7 oz)  Body mass index is 29.69 kg/m².    No intake or output data in the 24 hours ending 07/18/24 1315       Physical Exam  Vitals reviewed.   Constitutional:       General: He is not in acute distress.     Appearance: He is not toxic-appearing.   HENT:      Head: Normocephalic and  atraumatic.      Nose:      Comments: NGT in place to LIWS with dark gastric contents output     Mouth/Throat:      Mouth: Mucous membranes are dry.      Pharynx: Oropharynx is clear.   Eyes:      Conjunctiva/sclera: Conjunctivae normal.   Cardiovascular:      Rate and Rhythm: Regular rhythm. Tachycardia present.   Pulmonary:      Effort: Pulmonary effort is normal. No respiratory distress.   Abdominal:      General: There is distension.      Tenderness: There is no abdominal tenderness. There is no guarding or rebound.      Comments: Abdomen distended and firm but non-tender to palpation diffusely, no evidence of guarding or peritonitis, well-healed incisions    Musculoskeletal:      Cervical back: Normal range of motion.      Right lower leg: No edema.      Left lower leg: No edema.   Skin:     General: Skin is warm and dry.      Coloration: Skin is not jaundiced.   Neurological:      General: No focal deficit present.      Mental Status: He is alert. Mental status is at baseline.      Comments: Bilateral feet neuropathy            Vents:       Lines/Drains/Airways       Peripherally Inserted Central Catheter Line  Duration             PICC Double Lumen -- days              Peripheral Intravenous Line  Duration                  Peripheral IV - Single Lumen 07/18/24 18 G Right Antecubital <1 day         Peripheral IV - Single Lumen 07/18/24 20 G Left Antecubital <1 day                    Significant Labs:    CBC/Anemia Profile:  Recent Labs   Lab 07/18/24  1202   WBC 16.86*   HGB 13.2*   HCT 40.6   *   *   RDW 13.6        Chemistries:  Recent Labs   Lab 07/18/24  1202   *   K 5.1   CL 97   CO2 20*   BUN 42*   CREATININE 2.5*   CALCIUM 8.5*   ALBUMIN 2.3*   PROT 6.4   BILITOT 1.6*   ALKPHOS 243*   ALT 89*   AST 74*   MG 1.4*   PHOS 4.8*       All pertinent labs within the past 24 hours have been reviewed.    Significant Imaging: I have reviewed all pertinent imaging results/findings within the  past 24 hours.

## 2024-07-19 PROBLEM — Z91.89 AT RISK FOR OPPORTUNISTIC INFECTIONS: Status: ACTIVE | Noted: 2024-07-19

## 2024-07-19 PROBLEM — D84.821 DRUG-INDUCED IMMUNODEFICIENCY IN PATIENT WITH TRANSPLANTED ORGAN: Status: ACTIVE | Noted: 2024-07-19

## 2024-07-19 PROBLEM — Z94.9 DRUG-INDUCED IMMUNODEFICIENCY IN PATIENT WITH TRANSPLANTED ORGAN: Status: ACTIVE | Noted: 2024-07-19

## 2024-07-19 LAB
ALBUMIN SERPL BCP-MCNC: 1.9 G/DL (ref 3.5–5.2)
ALP SERPL-CCNC: 189 U/L (ref 55–135)
ALT SERPL W/O P-5'-P-CCNC: 63 U/L (ref 10–44)
ANION GAP SERPL CALC-SCNC: 11 MMOL/L (ref 8–16)
AST SERPL-CCNC: 52 U/L (ref 10–40)
BASOPHILS # BLD AUTO: 0.07 K/UL (ref 0–0.2)
BASOPHILS NFR BLD: 0.6 % (ref 0–1.9)
BILIRUB SERPL-MCNC: 0.9 MG/DL (ref 0.1–1)
BUN SERPL-MCNC: 40 MG/DL (ref 8–23)
CALCIUM SERPL-MCNC: 8.6 MG/DL (ref 8.7–10.5)
CHLORIDE SERPL-SCNC: 100 MMOL/L (ref 95–110)
CO2 SERPL-SCNC: 22 MMOL/L (ref 23–29)
CREAT SERPL-MCNC: 1.7 MG/DL (ref 0.5–1.4)
DIFFERENTIAL METHOD BLD: ABNORMAL
EOSINOPHIL # BLD AUTO: 0.1 K/UL (ref 0–0.5)
EOSINOPHIL NFR BLD: 1 % (ref 0–8)
ERYTHROCYTE [DISTWIDTH] IN BLOOD BY AUTOMATED COUNT: 14.1 % (ref 11.5–14.5)
EST. GFR  (NO RACE VARIABLE): 43.6 ML/MIN/1.73 M^2
GLUCOSE SERPL-MCNC: 89 MG/DL (ref 70–110)
HCT VFR BLD AUTO: 32.8 % (ref 40–54)
HGB BLD-MCNC: 11 G/DL (ref 14–18)
IMM GRANULOCYTES # BLD AUTO: 0.17 K/UL (ref 0–0.04)
IMM GRANULOCYTES NFR BLD AUTO: 1.4 % (ref 0–0.5)
LYMPHOCYTES # BLD AUTO: 0.7 K/UL (ref 1–4.8)
LYMPHOCYTES NFR BLD: 5.5 % (ref 18–48)
MAGNESIUM SERPL-MCNC: 1.5 MG/DL (ref 1.6–2.6)
MCH RBC QN AUTO: 32.8 PG (ref 27–31)
MCHC RBC AUTO-ENTMCNC: 33.5 G/DL (ref 32–36)
MCV RBC AUTO: 98 FL (ref 82–98)
MONOCYTES # BLD AUTO: 1 K/UL (ref 0.3–1)
MONOCYTES NFR BLD: 8 % (ref 4–15)
NEUTROPHILS # BLD AUTO: 10.3 K/UL (ref 1.8–7.7)
NEUTROPHILS NFR BLD: 83.5 % (ref 38–73)
NRBC BLD-RTO: 0 /100 WBC
PHOSPHATE SERPL-MCNC: 3.5 MG/DL (ref 2.7–4.5)
PLATELET # BLD AUTO: 91 K/UL (ref 150–450)
PLATELET BLD QL SMEAR: ABNORMAL
PMV BLD AUTO: 10.8 FL (ref 9.2–12.9)
POCT GLUCOSE: 150 MG/DL (ref 70–110)
POCT GLUCOSE: 87 MG/DL (ref 70–110)
POTASSIUM SERPL-SCNC: 4.5 MMOL/L (ref 3.5–5.1)
PROT SERPL-MCNC: 5.5 G/DL (ref 6–8.4)
RBC # BLD AUTO: 3.35 M/UL (ref 4.6–6.2)
SODIUM SERPL-SCNC: 133 MMOL/L (ref 136–145)
TACROLIMUS BLD-MCNC: <2 NG/ML (ref 5–15)
WBC # BLD AUTO: 12.28 K/UL (ref 3.9–12.7)

## 2024-07-19 PROCEDURE — 84100 ASSAY OF PHOSPHORUS: CPT | Performed by: STUDENT IN AN ORGANIZED HEALTH CARE EDUCATION/TRAINING PROGRAM

## 2024-07-19 PROCEDURE — 94761 N-INVAS EAR/PLS OXIMETRY MLT: CPT

## 2024-07-19 PROCEDURE — 25000003 PHARM REV CODE 250

## 2024-07-19 PROCEDURE — 85025 COMPLETE CBC W/AUTO DIFF WBC: CPT

## 2024-07-19 PROCEDURE — 80053 COMPREHEN METABOLIC PANEL: CPT

## 2024-07-19 PROCEDURE — 25500020 PHARM REV CODE 255

## 2024-07-19 PROCEDURE — 20600001 HC STEP DOWN PRIVATE ROOM

## 2024-07-19 PROCEDURE — 63600175 PHARM REV CODE 636 W HCPCS

## 2024-07-19 PROCEDURE — 63600175 PHARM REV CODE 636 W HCPCS: Performed by: STUDENT IN AN ORGANIZED HEALTH CARE EDUCATION/TRAINING PROGRAM

## 2024-07-19 PROCEDURE — 25000242 PHARM REV CODE 250 ALT 637 W/ HCPCS

## 2024-07-19 PROCEDURE — 80197 ASSAY OF TACROLIMUS: CPT

## 2024-07-19 PROCEDURE — 83735 ASSAY OF MAGNESIUM: CPT | Performed by: STUDENT IN AN ORGANIZED HEALTH CARE EDUCATION/TRAINING PROGRAM

## 2024-07-19 PROCEDURE — 99233 SBSQ HOSP IP/OBS HIGH 50: CPT | Mod: 24,GC,, | Performed by: SURGERY

## 2024-07-19 RX ORDER — HYDROMORPHONE HYDROCHLORIDE 1 MG/ML
0.5 INJECTION, SOLUTION INTRAMUSCULAR; INTRAVENOUS; SUBCUTANEOUS
Status: DISCONTINUED | OUTPATIENT
Start: 2024-07-19 | End: 2024-07-19

## 2024-07-19 RX ORDER — TACROLIMUS 0.5 MG/1
0.5 CAPSULE ORAL
Status: DISCONTINUED | OUTPATIENT
Start: 2024-07-26 | End: 2024-07-20

## 2024-07-19 RX ORDER — TACROLIMUS 0.5 MG/1
0.5 CAPSULE ORAL ONCE
Status: DISCONTINUED | OUTPATIENT
Start: 2024-07-19 | End: 2024-07-19

## 2024-07-19 RX ORDER — TACROLIMUS 1 MG/1
1 CAPSULE ORAL
Status: DISCONTINUED | OUTPATIENT
Start: 2024-07-26 | End: 2024-07-19

## 2024-07-19 RX ORDER — TACROLIMUS 0.5 MG/1
0.5 CAPSULE ORAL
Status: DISCONTINUED | OUTPATIENT
Start: 2024-07-24 | End: 2024-07-20

## 2024-07-19 RX ORDER — HYDROCODONE BITARTRATE AND ACETAMINOPHEN 10; 325 MG/1; MG/1
1 TABLET ORAL EVERY 6 HOURS PRN
Status: DISCONTINUED | OUTPATIENT
Start: 2024-07-19 | End: 2024-07-19

## 2024-07-19 RX ORDER — HYDROMORPHONE HYDROCHLORIDE 1 MG/ML
0.5 INJECTION, SOLUTION INTRAMUSCULAR; INTRAVENOUS; SUBCUTANEOUS EVERY 4 HOURS PRN
Status: DISCONTINUED | OUTPATIENT
Start: 2024-07-19 | End: 2024-07-21

## 2024-07-19 RX ORDER — TACROLIMUS 1 MG/1
1 CAPSULE ORAL
Status: DISCONTINUED | OUTPATIENT
Start: 2024-07-24 | End: 2024-07-19

## 2024-07-19 RX ORDER — TACROLIMUS 0.5 MG/1
0.5 CAPSULE ORAL EVERY MORNING
Status: DISCONTINUED | OUTPATIENT
Start: 2024-07-19 | End: 2024-07-19

## 2024-07-19 RX ORDER — TACROLIMUS 0.5 MG/1
0.5 CAPSULE ORAL
Status: DISCONTINUED | OUTPATIENT
Start: 2024-07-22 | End: 2024-07-20

## 2024-07-19 RX ORDER — HYDROCODONE BITARTRATE AND ACETAMINOPHEN 10; 325 MG/1; MG/1
1 TABLET ORAL EVERY 4 HOURS PRN
Status: DISCONTINUED | OUTPATIENT
Start: 2024-07-19 | End: 2024-07-21

## 2024-07-19 RX ORDER — TACROLIMUS 1 MG/1
1 CAPSULE ORAL
Status: DISCONTINUED | OUTPATIENT
Start: 2024-07-22 | End: 2024-07-19

## 2024-07-19 RX ORDER — TACROLIMUS 0.5 MG/1
0.5 CAPSULE ORAL EVERY MORNING
Status: CANCELLED | OUTPATIENT
Start: 2024-07-19

## 2024-07-19 RX ADMIN — PANTOPRAZOLE SODIUM 40 MG: 40 INJECTION, POWDER, FOR SOLUTION INTRAVENOUS at 09:07

## 2024-07-19 RX ADMIN — HEPARIN SODIUM 5000 UNITS: 5000 INJECTION INTRAVENOUS; SUBCUTANEOUS at 09:07

## 2024-07-19 RX ADMIN — HYDROMORPHONE HYDROCHLORIDE 0.5 MG: 0.5 INJECTION, SOLUTION INTRAMUSCULAR; INTRAVENOUS; SUBCUTANEOUS at 03:07

## 2024-07-19 RX ADMIN — HYDROMORPHONE HYDROCHLORIDE 0.5 MG: 0.5 INJECTION, SOLUTION INTRAMUSCULAR; INTRAVENOUS; SUBCUTANEOUS at 09:07

## 2024-07-19 RX ADMIN — HYDROCODONE BITARTRATE AND ACETAMINOPHEN 1 TABLET: 10; 325 TABLET ORAL at 09:07

## 2024-07-19 RX ADMIN — PIPERACILLIN SODIUM AND TAZOBACTAM SODIUM 4.5 G: 4; .5 INJECTION, POWDER, FOR SOLUTION INTRAVENOUS at 02:07

## 2024-07-19 RX ADMIN — PIPERACILLIN SODIUM AND TAZOBACTAM SODIUM 4.5 G: 4; .5 INJECTION, POWDER, FOR SOLUTION INTRAVENOUS at 06:07

## 2024-07-19 RX ADMIN — SODIUM CHLORIDE: 9 INJECTION, SOLUTION INTRAVENOUS at 04:07

## 2024-07-19 RX ADMIN — HEPARIN SODIUM 5000 UNITS: 5000 INJECTION INTRAVENOUS; SUBCUTANEOUS at 06:07

## 2024-07-19 RX ADMIN — HEPARIN SODIUM 5000 UNITS: 5000 INJECTION INTRAVENOUS; SUBCUTANEOUS at 02:07

## 2024-07-19 RX ADMIN — HYDROMORPHONE HYDROCHLORIDE 0.5 MG: 0.5 INJECTION, SOLUTION INTRAMUSCULAR; INTRAVENOUS; SUBCUTANEOUS at 12:07

## 2024-07-19 RX ADMIN — HYDROMORPHONE HYDROCHLORIDE 0.5 MG: 1 INJECTION, SOLUTION INTRAMUSCULAR; INTRAVENOUS; SUBCUTANEOUS at 11:07

## 2024-07-19 RX ADMIN — HYDROMORPHONE HYDROCHLORIDE 0.5 MG: 0.5 INJECTION, SOLUTION INTRAMUSCULAR; INTRAVENOUS; SUBCUTANEOUS at 07:07

## 2024-07-19 RX ADMIN — DIATRIZOATE MEGLUMINE AND DIATRIZOATE SODIUM 100 ML: 660; 100 LIQUID ORAL; RECTAL at 10:07

## 2024-07-19 RX ADMIN — HYDROMORPHONE HYDROCHLORIDE 0.5 MG: 1 INJECTION, SOLUTION INTRAMUSCULAR; INTRAVENOUS; SUBCUTANEOUS at 06:07

## 2024-07-19 RX ADMIN — SODIUM CHLORIDE: 9 INJECTION, SOLUTION INTRAVENOUS at 07:07

## 2024-07-19 RX ADMIN — TACROLIMUS 0.5 MG: 0.5 CAPSULE ORAL at 12:07

## 2024-07-19 RX ADMIN — MAGNESIUM SULFATE HEPTAHYDRATE 2 G: 40 INJECTION, SOLUTION INTRAVENOUS at 06:07

## 2024-07-19 RX ADMIN — TIOTROPIUM BROMIDE INHALATION SPRAY 2 PUFF: 3.12 SPRAY, METERED RESPIRATORY (INHALATION) at 07:07

## 2024-07-19 RX ADMIN — SODIUM CHLORIDE, POTASSIUM CHLORIDE, SODIUM LACTATE AND CALCIUM CHLORIDE 1000 ML: 600; 310; 30; 20 INJECTION, SOLUTION INTRAVENOUS at 07:07

## 2024-07-19 RX ADMIN — PIPERACILLIN SODIUM AND TAZOBACTAM SODIUM 4.5 G: 4; .5 INJECTION, POWDER, FOR SOLUTION INTRAVENOUS at 09:07

## 2024-07-19 RX ADMIN — HYDROMORPHONE HYDROCHLORIDE 0.5 MG: 0.5 INJECTION, SOLUTION INTRAMUSCULAR; INTRAVENOUS; SUBCUTANEOUS at 04:07

## 2024-07-19 RX ADMIN — HYDROMORPHONE HYDROCHLORIDE 0.5 MG: 0.5 INJECTION, SOLUTION INTRAMUSCULAR; INTRAVENOUS; SUBCUTANEOUS at 02:07

## 2024-07-19 RX ADMIN — TIOTROPIUM BROMIDE INHALATION SPRAY 2 PUFF: 3.12 SPRAY, METERED RESPIRATORY (INHALATION) at 06:07

## 2024-07-19 NOTE — PROGRESS NOTES
[facility] - [facility] Intensive Care  Critical Care - Surgery  Progress Note    Patient Name: Calixto Navarro Jr.  MRN: [id number]  Admission Date: 7/18/2024  Hospital Length of Stay: 1 days  Code Status: Full Code  Attending Provider: Reggie Guidry MD  Primary Care Provider: Radha, Primary Doctor   Principal Problem: SBO (small bowel obstruction)    Subjective:     Hospital/ICU Course:  No notes on file    Interval History/Significant Events: Afebrile, hemodynamically stable. Weaned off pressors overnight. Reports back pain, which is chronic and that he takes 5-6 Norcos daily for this. Denies abdominal pain. Passing flatus, no BM. NGT in place with 300ml thick gastric contents output.         Objective:     Vital Signs (Most Recent):  Temp: 98.1 °F (36.7 °C) (07/18/24 2345)  Pulse: 102 (07/19/24 0421)  Resp: 18 (07/19/24 0421)  BP: 100/61 (07/19/24 0200)  SpO2: (!) 92 % (07/19/24 0421) Vital Signs (24h Range):  Temp:  [97.8 °F (36.6 °C)-98.2 °F (36.8 °C)] 98.1 °F (36.7 °C)  Pulse:  [] 102  Resp:  [13-36] 18  SpO2:  [89 %-100 %] 92 %  BP: ()/(55-73) 100/61  Arterial Line BP: ()/(47-70) 108/52     Weight: 97.1 kg (214 lb)  Body mass index is 28.23 kg/m².      Intake/Output Summary (Last 24 hours) at 7/19/2024 0547  Last data filed at 7/19/2024 0300  Gross per 24 hour   Intake 5036.9 ml   Output 1925 ml   Net 3111.9 ml          Physical Exam  Vitals and nursing note reviewed.   Constitutional:       General: He is not in acute distress.     Appearance: He is not toxic-appearing.   HENT:      Head: Normocephalic and atraumatic.      Nose:      Comments: NGT in place to LIWS with dark gastric contents output     Mouth/Throat:      Mouth: Mucous membranes are dry.      Pharynx: Oropharynx is clear.   Eyes:      Conjunctiva/sclera: Conjunctivae normal.   Cardiovascular:      Rate and Rhythm: Regular rhythm. Tachycardia present.   Pulmonary:      Effort: Pulmonary effort is normal. No respiratory distress.       Comments: Satting well on 1L NC  Abdominal:      General: There is distension.      Tenderness: There is no abdominal tenderness. There is no guarding or rebound.      Comments: Abdomen distended and firm but non-tender to palpation diffusely, no evidence of guarding or peritonitis, well-healed incisions    Musculoskeletal:      Cervical back: Normal range of motion.      Right lower leg: No edema.      Left lower leg: No edema.   Skin:     General: Skin is warm and dry.      Coloration: Skin is not jaundiced.   Neurological:      General: No focal deficit present.      Mental Status: He is alert and oriented to person, place, and time. Mental status is at baseline.      Comments: Bilateral feet neuropathy            Vents:       Lines/Drains/Airways       Peripherally Inserted Central Catheter Line  Duration             PICC Triple Lumen right cephalic -- days              Arterial Line  Duration             Arterial Line 07/18/24 1452 Left Radial <1 day              Peripheral Intravenous Line  Duration                  Peripheral IV - Single Lumen 07/18/24 18 G Right Antecubital 1 day         Peripheral IV - Single Lumen 07/18/24 20 G Left Antecubital 1 day                    Significant Labs:    CBC/Anemia Profile:  Recent Labs   Lab 07/18/24  1202 07/18/24  1737 07/19/24  0353   WBC 16.86* 15.09* 12.28   HGB 13.2* 11.9* 11.0*   HCT 40.6 36.0* 32.8*   * 96* 91*   * 99* 98   RDW 13.6 13.8 14.1        Chemistries:  Recent Labs   Lab 07/18/24  1202 07/18/24  1737 07/19/24  0353   * 129* 133*   K 5.1 5.1 4.5   CL 97 98 100   CO2 20* 18* 22*   BUN 42* 41* 40*   CREATININE 2.5* 2.0* 1.7*   CALCIUM 8.5* 8.5* 8.6*   ALBUMIN 2.3* 2.1* 1.9*   PROT 6.4 5.9* 5.5*   BILITOT 1.6* 1.3* 0.9   ALKPHOS 243* 216* 189*   ALT 89* 79* 63*   AST 74* 65* 52*   MG 1.4* 1.4* 1.5*   PHOS 4.8* 4.0 3.5       Blood Culture:   Recent Labs   Lab 07/18/24  1254 07/18/24  1309   [facility] No Growth to date No Growth to date        Significant Imaging:  I have reviewed all pertinent imaging results/findings within the past 24 hours.  Assessment/Plan:     GI  * SBO (small bowel obstruction)  68m hx liver transplant 2001, AF s/p Watchman not on AC p/w 2d hx worsening nausea, vomiting, abdominal distention likely 2/2 SBO. General surgery consulted & following, pt admitted to SICU for stabilization and co-management.      Neuro/Psych:   -- Sedation: none  -- Pain: 0.5mg IV dilaudid prn  -- Alert and oriented             Cards:   -- HDS  -- Weaned off pressor support overnight   -- Echo: EF 60-65%, normal systolic function, mild aortic and mitral valve stenosis  -- Hx AF s/p Watchman procedure, reports being off previously documented Eliquis for months; does take ASA (held since 7/16)      Pulm:   -- Goal O2 sat > 90%, stable on 1L NC      Renal:  -- BUN/Cr downtrending, 40/1.7, ALMA DELIA resolving  -- Adequate UOP 1.92L   -- NS @ 125ml/hr  -- Na improving 133 from 129  -- Daily CMP    Intake/Output - Last 3 Shifts         07/17 0700  07/18 0659 07/18 0700  07/19 0659    I.V. (mL/kg)  1652.9 (17)    IV Piggyback  3384    Total Intake(mL/kg)  5036.9 (51.9)    Urine (mL/kg/hr)  1925    Total Output  1925    Net  +3111.9                      FEN / GI:   -- Replace lytes as needed  -- Nutrition: NPO  -- NGT: 300cc thick gastric contents  -- Plan for SBFT today  -- GI ppx: pantoprazole  -- Bowel reg: none  -- SBO:  Abdomen distended, tender, but not peritonitic on exam  NG in place, flushed bedside  ACS following, low threshold for OR      ID:   -- Tm: afebrile  -- WBC 12.2 (15)  -- Received results of positive blood cultures with GNR from outside hospital, repeat blood cultures drawn  -- Started on Zosyn for empiric coverage      Heme/Onc:   -- H/H stable at 11/32.8  -- Daily CBC  -- DVT ppx: SQH    Recent Labs   Lab 07/19/24  0353   WBC 12.28   RBC 3.35*   HGB 11.0*   HCT 32.8*   PLT 91*   MCV 98   MCH 32.8*   MCHC 33.5          Endo:   -- BG goal  140-180  -- POCT       PPx:   Feeding: NPO  Analgesia/Sedation: PRN dilaudid / none  Thromboembolic prevention: SQH, SCD  HOB >30: y  Stress Ulcer ppx: pantoprazole  Glucose control: Critical care goal 140-180 g/dl    Lines/Drains/Airway: PIV x2, NGT      Dispo/Code Status/Palliative:   -- SICU / Full Code        Critical care was time spent personally by me on the following activities: development of treatment plan with patient or surrogate and bedside caregivers, discussions with consultants, evaluation of patient's response to treatment, examination of patient, ordering and performing treatments and interventions, ordering and review of laboratory studies, ordering and review of radiographic studies, pulse oximetry, re-evaluation of patient's condition.  This critical care time did not overlap with that of any other provider or involve time for any procedures.     Brittany Alvarenga MD  Critical Care - Surgery  [facility] - [facility]ensive [facility]

## 2024-07-19 NOTE — ASSESSMENT & PLAN NOTE
68m hx liver transplant 2001, AF s/p Watchman not on AC p/w 2d hx worsening nausea, vomiting, abdominal distention likely 2/2 SBO. General surgery consulted & following, pt admitted to SICU for stabilization and co-management.      Neuro/Psych:   -- Sedation: none  -- Pain: 0.5mg IV dilaudid prn  -- Alert and oriented             Cards:   -- HDS  -- Weaned off pressor support overnight   -- Echo: EF 60-65%, normal systolic function, mild aortic and mitral valve stenosis  -- Hx AF s/p Watchman procedure, reports being off previously documented Eliquis for months; does take ASA (held since 7/16)      Pulm:   -- Goal O2 sat > 90%, stable on 1L NC      Renal:  -- BUN/Cr downtrending, 40/1.7, ALMA DELIA resolving  -- Adequate UOP 1.92L   -- NS @ 125ml/hr  -- Na improving 133 from 129  -- Daily CMP    Intake/Output - Last 3 Shifts         07/17 0700 07/18 0659 07/18 0700 07/19 0659    I.V. (mL/kg)  1652.9 (17)    IV Piggyback  3384    Total Intake(mL/kg)  5036.9 (51.9)    Urine (mL/kg/hr)  1925    Total Output  1925    Net  +3111.9                      FEN / GI:   -- Replace lytes as needed  -- Nutrition: NPO  -- NGT: 300cc thick gastric contents  -- Plan for SBFT today  -- GI ppx: pantoprazole  -- Bowel reg: none  -- SBO:  Abdomen distended, tender, but not peritonitic on exam  NG in place, flushed bedside  ACS following, low threshold for OR      ID:   -- Tm: afebrile  -- WBC 12.2 (15)  -- Received results of positive blood cultures with GNR from outside hospital, repeat blood cultures drawn  -- Started on Zosyn for empiric coverage      Heme/Onc:   -- H/H stable at 11/32.8  -- Daily CBC  -- DVT ppx: SQH    Recent Labs   Lab 07/19/24  0353   WBC 12.28   RBC 3.35*   HGB 11.0*   HCT 32.8*   PLT 91*   MCV 98   MCH 32.8*   MCHC 33.5          Endo:   -- BG goal 140-180  -- POCT       PPx:   Feeding: NPO  Analgesia/Sedation: PRN dilaudid / none  Thromboembolic prevention: SQH, SCD  HOB >30: y  Stress Ulcer ppx:  pantoprazole  Glucose control: Critical care goal 140-180 g/dl    Lines/Drains/Airway: PIV x2, NGT      Dispo/Code Status/Palliative:   -- SICU / Full Code

## 2024-07-19 NOTE — PLAN OF CARE
[facility] - [facility]  Initial Discharge Assessment       Primary Care Provider: No, Primary Doctor    Admission Diagnosis: Small bowel obstruction [K56.609]  Tachycardia [R00.0]  Shock circulatory [R57.9]    Admission Date: 7/18/2024  Expected Discharge Date: 7/25/2024    Transition of Care Barriers: None    Payor: MEDICARE / Plan: MEDICARE PART A & B / Product Type: Government /     Extended Emergency Contact Information  Primary Emergency Contact: Alla bryant  Mobile Phone: [phone]  Relation: Spouse   needed? No  Secondary Emergency Contact: Wen Martinez  Mobile Phone: [phone]  Relation: Daughter  Preferred language: English    Discharge Plan A: Home, Home with family  Discharge Plan B: Home with family      [facility]  [address], [place]  [address], [place]  [place] [address]  Phone: [phone] Fax: [phone]      Initial Assessment (most recent)       Adult Discharge Assessment - 07/19/24 1150          Discharge Assessment    Assessment Type Discharge Planning Assessment     Confirmed/corrected address, phone number and insurance Yes     Confirmed Demographics Correct on Facesheet     Source of Information patient     Communicated HUMBERTO with patient/caregiver Date not available/Unable to determine     Reason For Admission SBO (small bowel obstruction)     People in Home spouse     Do you expect to return to your current living situation? Yes     Do you have help at home or someone to help you manage your care at home? Yes     Who are your caregiver(s) and their phone number(s)? Alla Bryant ( Wife)     Prior to hospitilization cognitive status: Alert/Oriented     Current cognitive status: Alert/Oriented     Walking or Climbing Stairs Difficulty no     Dressing/Bathing Difficulty no     Equipment Currently Used at Home none     Readmission within 30 days? No     Patient currently being followed by outpatient case management?  No     Do you currently have service(s) that help you manage your care at home? No     Do you take prescription medications? Yes     Do you have prescription coverage? Yes     Coverage Blue Cross Blue Shield / Medicare     Is the patient taking medications as prescribed? yes     Who is going to help you get home at discharge? Alla Navarro     Are you on dialysis? No     Do you take coumadin? No     Discharge Plan A Home;Home with family     Discharge Plan B Home with family     DME Needed Upon Discharge  shower chair   TBD    Discharge Plan discussed with: Spouse/sig other;Patient     Transition of Care Barriers None        Physical Activity    On average, how many days per week do you engage in moderate to strenuous exercise (like a brisk walk)? 7 days        Financial Resource Strain    How hard is it for you to pay for the very basics like food, housing, medical care, and heating? Not hard at all        Housing Stability    In the last 12 months, was there a time when you were not able to pay the mortgage or rent on time? No     At any time in the past 12 months, were you homeless or living in a shelter (including now)? No        Transportation Needs    Has the lack of transportation kept you from medical appointments, meetings, work or from getting things needed for daily living? No        Food Insecurity    Within the past 12 months, you worried that your food would run out before you got the money to buy more. Never true     Within the past 12 months, the food you bought just didn't last and you didn't have money to get more. Never true        Social Isolation    How often do you feel lonely or isolated from those around you?  Never        Utilities    In the past 12 months has the electric, gas, oil, or water company threatened to shut off services in your home? No                        This SW met with patient and his wife at bedside to complete DPA. Questions answered / contact numbers provided.  Use  PREFERRED PHARMACY / BEDSIDE DELIVERY for any necessary medications at time of discharge. The patient is independent with all ADLs - does not use DME, In-home equipment, is not on HD, Coumadin or home oxygen.The patient's wife will be assisting with help upon discharge. The patient's wife  will be providing transportation home.     Will continue to follow for course of hospitalization.     Amy Boyer LCSW  Case Management [facility]

## 2024-07-19 NOTE — SUBJECTIVE & OBJECTIVE
Interval History/Significant Events: Afebrile, hemodynamically stable. Weaned off pressors overnight. Reports back pain, which is chronic and that he takes 5-6 Norcos daily for this. Denies abdominal pain. Passing flatus, no BM. NGT in place with 300ml thick gastric contents output.         Objective:     Vital Signs (Most Recent):  Temp: 98.1 °F (36.7 °C) (07/18/24 2345)  Pulse: 102 (07/19/24 0421)  Resp: 18 (07/19/24 0421)  BP: 100/61 (07/19/24 0200)  SpO2: (!) 92 % (07/19/24 0421) Vital Signs (24h Range):  Temp:  [97.8 °F (36.6 °C)-98.2 °F (36.8 °C)] 98.1 °F (36.7 °C)  Pulse:  [] 102  Resp:  [13-36] 18  SpO2:  [89 %-100 %] 92 %  BP: ()/(55-73) 100/61  Arterial Line BP: ()/(47-70) 108/52     Weight: 97.1 kg (214 lb)  Body mass index is 28.23 kg/m².      Intake/Output Summary (Last 24 hours) at 7/19/2024 0547  Last data filed at 7/19/2024 0300  Gross per 24 hour   Intake 5036.9 ml   Output 1925 ml   Net 3111.9 ml          Physical Exam  Vitals and nursing note reviewed.   Constitutional:       General: He is not in acute distress.     Appearance: He is not toxic-appearing.   HENT:      Head: Normocephalic and atraumatic.      Nose:      Comments: NGT in place to LIWS with dark gastric contents output     Mouth/Throat:      Mouth: Mucous membranes are dry.      Pharynx: Oropharynx is clear.   Eyes:      Conjunctiva/sclera: Conjunctivae normal.   Cardiovascular:      Rate and Rhythm: Regular rhythm. Tachycardia present.   Pulmonary:      Effort: Pulmonary effort is normal. No respiratory distress.      Comments: Satting well on 1L NC  Abdominal:      General: There is distension.      Tenderness: There is no abdominal tenderness. There is no guarding or rebound.      Comments: Abdomen distended and firm but non-tender to palpation diffusely, no evidence of guarding or peritonitis, well-healed incisions    Musculoskeletal:      Cervical back: Normal range of motion.      Right lower leg: No edema.       Left lower leg: No edema.   Skin:     General: Skin is warm and dry.      Coloration: Skin is not jaundiced.   Neurological:      General: No focal deficit present.      Mental Status: He is alert and oriented to person, place, and time. Mental status is at baseline.      Comments: Bilateral feet neuropathy            Vents:       Lines/Drains/Airways       Peripherally Inserted Central Catheter Line  Duration             PICC Triple Lumen right cephalic -- days              Arterial Line  Duration             Arterial Line 07/18/24 1452 Left Radial <1 day              Peripheral Intravenous Line  Duration                  Peripheral IV - Single Lumen 07/18/24 18 G Right Antecubital 1 day         Peripheral IV - Single Lumen 07/18/24 20 G Left Antecubital 1 day                    Significant Labs:    CBC/Anemia Profile:  Recent Labs   Lab 07/18/24  1202 07/18/24  1737 07/19/24  0353   WBC 16.86* 15.09* 12.28   HGB 13.2* 11.9* 11.0*   HCT 40.6 36.0* 32.8*   * 96* 91*   * 99* 98   RDW 13.6 13.8 14.1        Chemistries:  Recent Labs   Lab 07/18/24  1202 07/18/24  1737 07/19/24  0353   * 129* 133*   K 5.1 5.1 4.5   CL 97 98 100   CO2 20* 18* 22*   BUN 42* 41* 40*   CREATININE 2.5* 2.0* 1.7*   CALCIUM 8.5* 8.5* 8.6*   ALBUMIN 2.3* 2.1* 1.9*   PROT 6.4 5.9* 5.5*   BILITOT 1.6* 1.3* 0.9   ALKPHOS 243* 216* 189*   ALT 89* 79* 63*   AST 74* 65* 52*   MG 1.4* 1.4* 1.5*   PHOS 4.8* 4.0 3.5       Blood Culture:   Recent Labs   Lab 07/18/24  1254 07/18/24  1309   LABBLOO No Growth to date No Growth to date       Significant Imaging:  I have reviewed all pertinent imaging results/findings within the past 24 hours.

## 2024-07-19 NOTE — PROGRESS NOTES
[facility] - [facility]ensive [facility]  General Surgery  Progress Note    Subjective:     History of Present Illness:  Patient is a 68 year old male with h/o ESLD s/p liver transplant in 2001, A fib s/p Watchman procedure (not currently on anticoagulation), GERD, HTN who was transferred here from OSH with 2 days of abdominal pain. He reports diffuse abdominal pain that worsened since onset with associated nausea and vomiting. Had diarrhea earlier in the week after miralax then developed constipation, bloating, and obstipation last night. Denies fever, chills, CP, SOB, and all other symptoms.     He presented to OSH ED and was found to be hypotensive. Given 2L NS and started on abx.   WBC 10.3 > 16  Lactic acid at OSH 2.3 > 2.5. Repeat here pending  CT a/p with concerning for SBO with evidence of pneumatosis and pneumobilia.     Upon eval in the ED, he is afebrile. Levo 0.2 via peripheral line on cuff pressures with MAPs in 80s. Denies abdominal pain and nausea.   NGT with ~150cc in bedside cannister    Post-Op Info:  * No surgery found *         Interval Hx 7/19/24: NAEON. Off of levo. Hemodynamics are within normal limits. Lactate down trending. Pt overall feeling well. NPO. UOP adequate.     No current facility-administered medications on file prior to encounter.     Current Outpatient Medications on File Prior to Encounter   Medication Sig    amiodarone (PACERONE) 200 MG Tab 400 mg.    amitriptyline (ELAVIL) 75 MG tablet Take by mouth.    amLODIPine (NORVASC) 5 MG tablet Take by mouth.    ELIQUIS 5 mg Tab Take by mouth.    fluticasone propionate (FLONASE) 50 mcg/actuation nasal spray by Each Nostril route.    HYDROcodone-acetaminophen (NORCO)  mg per tablet Take by mouth.    meloxicam (MOBIC) 15 MG tablet Take by mouth.    metoprolol succinate (TOPROL-XL) 25 MG 24 hr tablet Take by mouth.    pantoprazole (PROTONIX) 40 MG tablet Take by mouth.    pravastatin (PRAVACHOL) 20 MG tablet Take by mouth.    tacrolimus  (PROGRAF) 1 MG Cap Take 1 mg by mouth.       Review of patient's allergies indicates:   Allergen Reactions    Gabapentin        Past Medical History:   Diagnosis Date    Afib     GERD (gastroesophageal reflux disease)     Hypertension      Past Surgical History:   Procedure Laterality Date    LIVER TRANSPLANT       Family History    None       Tobacco Use    Smoking status: Never    Smokeless tobacco: Never   Substance and Sexual Activity    Alcohol use: Not on file    Drug use: Not on file    Sexual activity: Not on file     Review of Systems   Constitutional:  Negative for chills and fever.   Respiratory:  Negative for cough and shortness of breath.    Cardiovascular:  Negative for chest pain.   Gastrointestinal:  Positive for abdominal pain, constipation, nausea and vomiting. Negative for diarrhea.   Genitourinary:  Negative for dysuria and hematuria.   Musculoskeletal:  Negative for back pain and myalgias.   Skin:  Negative for rash.   Neurological:  Negative for dizziness and headaches.   Psychiatric/Behavioral:  Negative for confusion. The patient is not nervous/anxious.    All other systems reviewed and are negative.    Objective:     Vital Signs (Most Recent):  Temp: 98.2 °F (36.8 °C) (07/18/24 1330)  Pulse: 103 (07/18/24 1330)  Resp: 20 (07/18/24 1330)  BP: 109/62 (07/18/24 1330)  SpO2: (!) 92 % (07/18/24 1330) Vital Signs (24h Range):  Temp:  [97.9 °F (36.6 °C)-98.2 °F (36.8 °C)] 98.2 °F (36.8 °C)  Pulse:  [103-111] 103  Resp:  [18-22] 20  SpO2:  [89 %-93 %] 92 %  BP: (102-127)/(62-73) 109/62     Weight: 99.3 kg (218 lb 14.7 oz)  Body mass index is 29.69 kg/m².     Physical Exam  Vitals and nursing note reviewed.   Constitutional:       General: He is not in acute distress.     Appearance: He is not ill-appearing or diaphoretic.      Comments: 1L O2 via NC  NGT to LIWS    HENT:      Head: Normocephalic and atraumatic.      Mouth/Throat:      Mouth: Mucous membranes are dry.      Pharynx: Oropharynx is  clear.   Eyes:      Extraocular Movements: Extraocular movements intact.      Conjunctiva/sclera: Conjunctivae normal.   Cardiovascular:      Rate and Rhythm: Tachycardia present.   Pulmonary:      Effort: Pulmonary effort is normal. No respiratory distress.   Abdominal:      Palpations: Abdomen is soft.      Tenderness: There is no guarding or rebound.      Comments: Well healed surgical scar noted  Distended with some firmness but no TTP to light or deep palpation. No peritonitic signs    Musculoskeletal:         General: No deformity.   Skin:     General: Skin is warm and dry.      Coloration: Skin is not jaundiced.   Neurological:      Mental Status: He is alert and oriented to person, place, and time.            I have reviewed all pertinent lab results within the past 24 hours.    Significant Diagnostics:  I have reviewed all pertinent imaging results/findings within the past 24 hours.    Assessment/Plan:     * SBO (small bowel obstruction)  68 year old male with h/o ESLD s/p liver transplant in 2001, A fib s/p Watchman procedure (not currently on anticoagulation), GERD, HTN who was transferred here from OSH with 2 days of abdominal pain, concern for SBO. Hypotensive on arrival with concerning CT a/p, however, abdominal exam is reassuring and patient volume down on exam. Improving overall, labs reassuring, clinical picture reassuring.    - NPO, mIVF  - NGT to LIWS  - serial abdominal exams  - Broad spectrum abx (zosyn)  - daily labs  - KTM transplant for immunosuppression medication in setting of liver transplant  - Home meds reviewed and reconciled. Not taking amio or eliquis. Metoprolol converted to IV   - Dvt ppx (SCDs and heparin)  - Gi ppx (PPI)  - Remainder of care per SICU  - Please contact general surgery with any questions, concerns, or clinical status changes        Usama Marshall MD  General Surgery  [facility] - [facility]

## 2024-07-19 NOTE — PLAN OF CARE
SICU PLAN OF CARE    Dx: SBO (small bowel obstruction)    Goals of Care: MAP > 65, SpO2 > 88%    Vital Signs (last 12 hours):   Temp:  [98.1 °F (36.7 °C)-98.4 °F (36.9 °C)]   Pulse:  []   Resp:  [13-32]   BP: ()/(55-67)   SpO2:  [88 %-100 %]   Arterial Line BP: ()/(44-59)      Neuro: AAOx4, Follows commands , and Moves all extremities spontaneously     Cardiac: NSR - Sinus Tach    Respiratory: Room Air    Gtts: MIVF    Urine Output: Voids Spontaneously  2025 mL/shift    Drains: NG/OG Tube, total output 300mL/shift    Diet: NPO      Labs/Accuchecks: Daily AM-lab/ Accucheck qshift    Skin:  Current LDAs is present, no new skin breakdown noted.  Patient turned q2h, bony prominences protected, and mattress inflated/working correctly.   Juan Carlos Score: 17.     Shift Events:  Levo and vaso weaned off. PRN pain medication given, patient mildly responded. Electrolytes repleted.     See flowsheet for further assessment/details.  Family updated on current condition/plan of care, questions answered, and emotional support provided.  MD updated on current condition, vitals, labs, and gtts.

## 2024-07-20 LAB
ACINETOBACTER CALCOACETICUS/BAUMANNII COMPLEX: NOT DETECTED
ALBUMIN SERPL BCP-MCNC: 1.8 G/DL (ref 3.5–5.2)
ALBUMIN SERPL BCP-MCNC: 1.8 G/DL (ref 3.5–5.2)
ALP SERPL-CCNC: 188 U/L (ref 55–135)
ALP SERPL-CCNC: 188 U/L (ref 55–135)
ALT SERPL W/O P-5'-P-CCNC: 43 U/L (ref 10–44)
ALT SERPL W/O P-5'-P-CCNC: 43 U/L (ref 10–44)
ANION GAP SERPL CALC-SCNC: 8 MMOL/L (ref 8–16)
ANION GAP SERPL CALC-SCNC: 8 MMOL/L (ref 8–16)
ANISOCYTOSIS BLD QL SMEAR: SLIGHT
ANISOCYTOSIS BLD QL SMEAR: SLIGHT
AST SERPL-CCNC: 29 U/L (ref 10–40)
AST SERPL-CCNC: 29 U/L (ref 10–40)
BACTEROIDES FRAGILIS: NOT DETECTED
BASOPHILS # BLD AUTO: 0.06 K/UL (ref 0–0.2)
BASOPHILS # BLD AUTO: 0.06 K/UL (ref 0–0.2)
BASOPHILS NFR BLD: 0.5 % (ref 0–1.9)
BASOPHILS NFR BLD: 0.5 % (ref 0–1.9)
BILIRUB SERPL-MCNC: 0.5 MG/DL (ref 0.1–1)
BILIRUB SERPL-MCNC: 0.5 MG/DL (ref 0.1–1)
BUN SERPL-MCNC: 24 MG/DL (ref 8–23)
BUN SERPL-MCNC: 24 MG/DL (ref 8–23)
CALCIUM SERPL-MCNC: 8.6 MG/DL (ref 8.7–10.5)
CALCIUM SERPL-MCNC: 8.6 MG/DL (ref 8.7–10.5)
CANDIDA ALBICANS: NOT DETECTED
CANDIDA AURIS: NOT DETECTED
CANDIDA GLABRATA: NOT DETECTED
CANDIDA KRUSEI: NOT DETECTED
CANDIDA PARAPSILOSIS: NOT DETECTED
CANDIDA TROPICALIS: NOT DETECTED
CHLORIDE SERPL-SCNC: 106 MMOL/L (ref 95–110)
CHLORIDE SERPL-SCNC: 106 MMOL/L (ref 95–110)
CO2 SERPL-SCNC: 24 MMOL/L (ref 23–29)
CO2 SERPL-SCNC: 24 MMOL/L (ref 23–29)
CREAT SERPL-MCNC: 1.1 MG/DL (ref 0.5–1.4)
CREAT SERPL-MCNC: 1.1 MG/DL (ref 0.5–1.4)
CRYPTOCOCCUS NEOFORMANS/GATTII: NOT DETECTED
CTX-M GENE (ESBL PRODUCER): NORMAL
DIFFERENTIAL METHOD BLD: ABNORMAL
DIFFERENTIAL METHOD BLD: ABNORMAL
ENTEROBACTER CLOACAE COMPLEX: NOT DETECTED
ENTEROBACTERALES: NOT DETECTED
ENTEROCOCCUS FAECALIS: NOT DETECTED
ENTEROCOCCUS FAECIUM: NOT DETECTED
EOSINOPHIL # BLD AUTO: 0.2 K/UL (ref 0–0.5)
EOSINOPHIL # BLD AUTO: 0.2 K/UL (ref 0–0.5)
EOSINOPHIL NFR BLD: 1.2 % (ref 0–8)
EOSINOPHIL NFR BLD: 1.2 % (ref 0–8)
ERYTHROCYTE [DISTWIDTH] IN BLOOD BY AUTOMATED COUNT: 14.6 % (ref 11.5–14.5)
ERYTHROCYTE [DISTWIDTH] IN BLOOD BY AUTOMATED COUNT: 14.6 % (ref 11.5–14.5)
ESCHERICHIA COLI: NOT DETECTED
EST. GFR  (NO RACE VARIABLE): >60 ML/MIN/1.73 M^2
EST. GFR  (NO RACE VARIABLE): >60 ML/MIN/1.73 M^2
GLUCOSE SERPL-MCNC: 91 MG/DL (ref 70–110)
GLUCOSE SERPL-MCNC: 91 MG/DL (ref 70–110)
HAEMOPHILUS INFLUENZAE: NOT DETECTED
HCT VFR BLD AUTO: 32.2 % (ref 40–54)
HCT VFR BLD AUTO: 32.2 % (ref 40–54)
HGB BLD-MCNC: 10.6 G/DL (ref 14–18)
HGB BLD-MCNC: 10.6 G/DL (ref 14–18)
HYPOCHROMIA BLD QL SMEAR: ABNORMAL
HYPOCHROMIA BLD QL SMEAR: ABNORMAL
IMM GRANULOCYTES # BLD AUTO: 0.12 K/UL (ref 0–0.04)
IMM GRANULOCYTES # BLD AUTO: 0.12 K/UL (ref 0–0.04)
IMM GRANULOCYTES NFR BLD AUTO: 1 % (ref 0–0.5)
IMM GRANULOCYTES NFR BLD AUTO: 1 % (ref 0–0.5)
IMP GENE (CARBAPENEM RESISTANT): NORMAL
KLEBSIELLA AEROGENES: NOT DETECTED
KLEBSIELLA OXYTOCA: NOT DETECTED
KLEBSIELLA PNEUMONIAE GROUP: NOT DETECTED
KPC RESISTANCE GENE (CARBAPENEM): NORMAL
LISTERIA MONOCYTOGENES: NOT DETECTED
LYMPHOCYTES # BLD AUTO: 1 K/UL (ref 1–4.8)
LYMPHOCYTES # BLD AUTO: 1 K/UL (ref 1–4.8)
LYMPHOCYTES NFR BLD: 8 % (ref 18–48)
LYMPHOCYTES NFR BLD: 8 % (ref 18–48)
MAGNESIUM SERPL-MCNC: 1.9 MG/DL (ref 1.6–2.6)
MCH RBC QN AUTO: 32.4 PG (ref 27–31)
MCH RBC QN AUTO: 32.4 PG (ref 27–31)
MCHC RBC AUTO-ENTMCNC: 32.9 G/DL (ref 32–36)
MCHC RBC AUTO-ENTMCNC: 32.9 G/DL (ref 32–36)
MCR-1: NORMAL
MCV RBC AUTO: 99 FL (ref 82–98)
MCV RBC AUTO: 99 FL (ref 82–98)
MEC A/C AND MREJ (MRSA): NORMAL
MEC A/C: NORMAL
MONOCYTES # BLD AUTO: 1.3 K/UL (ref 0.3–1)
MONOCYTES # BLD AUTO: 1.3 K/UL (ref 0.3–1)
MONOCYTES NFR BLD: 9.9 % (ref 4–15)
MONOCYTES NFR BLD: 9.9 % (ref 4–15)
NDM GENE (CARBAPENEM RESISTANT): NORMAL
NEISSERIA MENINGITIDIS: NOT DETECTED
NEUTROPHILS # BLD AUTO: 10 K/UL (ref 1.8–7.7)
NEUTROPHILS # BLD AUTO: 10 K/UL (ref 1.8–7.7)
NEUTROPHILS NFR BLD: 79.4 % (ref 38–73)
NEUTROPHILS NFR BLD: 79.4 % (ref 38–73)
NRBC BLD-RTO: 0 /100 WBC
NRBC BLD-RTO: 0 /100 WBC
OVALOCYTES BLD QL SMEAR: ABNORMAL
OVALOCYTES BLD QL SMEAR: ABNORMAL
OXA-48-LIKE (CARBAPENEM RESISTANT): NORMAL
PHOSPHATE SERPL-MCNC: 3.8 MG/DL (ref 2.7–4.5)
PLATELET # BLD AUTO: 93 K/UL (ref 150–450)
PLATELET # BLD AUTO: 93 K/UL (ref 150–450)
PMV BLD AUTO: 10.9 FL (ref 9.2–12.9)
PMV BLD AUTO: 10.9 FL (ref 9.2–12.9)
POCT GLUCOSE: 182 MG/DL (ref 70–110)
POIKILOCYTOSIS BLD QL SMEAR: SLIGHT
POIKILOCYTOSIS BLD QL SMEAR: SLIGHT
POLYCHROMASIA BLD QL SMEAR: ABNORMAL
POLYCHROMASIA BLD QL SMEAR: ABNORMAL
POTASSIUM SERPL-SCNC: 3.9 MMOL/L (ref 3.5–5.1)
POTASSIUM SERPL-SCNC: 3.9 MMOL/L (ref 3.5–5.1)
PROT SERPL-MCNC: 5.6 G/DL (ref 6–8.4)
PROT SERPL-MCNC: 5.6 G/DL (ref 6–8.4)
PROTEUS SPECIES: NOT DETECTED
PSEUDOMONAS AERUGINOSA: NOT DETECTED
RBC # BLD AUTO: 3.27 M/UL (ref 4.6–6.2)
RBC # BLD AUTO: 3.27 M/UL (ref 4.6–6.2)
SALMONELLA SP: NOT DETECTED
SERRATIA MARCESCENS: NOT DETECTED
SODIUM SERPL-SCNC: 138 MMOL/L (ref 136–145)
SODIUM SERPL-SCNC: 138 MMOL/L (ref 136–145)
SPHEROCYTES BLD QL SMEAR: ABNORMAL
SPHEROCYTES BLD QL SMEAR: ABNORMAL
STAPHYLOCOCCUS AUREUS: NOT DETECTED
STAPHYLOCOCCUS EPIDERMIDIS: NOT DETECTED
STAPHYLOCOCCUS LUGDUNESIS: NOT DETECTED
STAPHYLOCOCCUS SPECIES: NOT DETECTED
STENOTROPHOMONAS MALTOPHILIA: NOT DETECTED
STREPTOCOCCUS AGALACTIAE: NOT DETECTED
STREPTOCOCCUS PNEUMONIAE: NOT DETECTED
STREPTOCOCCUS PYOGENES: NOT DETECTED
STREPTOCOCCUS SPECIES: NOT DETECTED
TACROLIMUS BLD-MCNC: 2.5 NG/ML (ref 5–15)
VAN A/B (VRE GENE): NORMAL
VIM GENE (CARBAPENEM RESISTANT): NORMAL
WBC # BLD AUTO: 12.63 K/UL (ref 3.9–12.7)
WBC # BLD AUTO: 12.63 K/UL (ref 3.9–12.7)

## 2024-07-20 PROCEDURE — 63600175 PHARM REV CODE 636 W HCPCS

## 2024-07-20 PROCEDURE — 25000003 PHARM REV CODE 250

## 2024-07-20 PROCEDURE — 85025 COMPLETE CBC W/AUTO DIFF WBC: CPT | Performed by: STUDENT IN AN ORGANIZED HEALTH CARE EDUCATION/TRAINING PROGRAM

## 2024-07-20 PROCEDURE — 80053 COMPREHEN METABOLIC PANEL: CPT | Performed by: STUDENT IN AN ORGANIZED HEALTH CARE EDUCATION/TRAINING PROGRAM

## 2024-07-20 PROCEDURE — 99223 1ST HOSP IP/OBS HIGH 75: CPT | Mod: ,,, | Performed by: INTERNAL MEDICINE

## 2024-07-20 PROCEDURE — 63600175 PHARM REV CODE 636 W HCPCS: Performed by: STUDENT IN AN ORGANIZED HEALTH CARE EDUCATION/TRAINING PROGRAM

## 2024-07-20 PROCEDURE — 80197 ASSAY OF TACROLIMUS: CPT | Performed by: STUDENT IN AN ORGANIZED HEALTH CARE EDUCATION/TRAINING PROGRAM

## 2024-07-20 PROCEDURE — 21400001 HC TELEMETRY ROOM

## 2024-07-20 PROCEDURE — 84100 ASSAY OF PHOSPHORUS: CPT | Performed by: STUDENT IN AN ORGANIZED HEALTH CARE EDUCATION/TRAINING PROGRAM

## 2024-07-20 PROCEDURE — 83735 ASSAY OF MAGNESIUM: CPT | Performed by: STUDENT IN AN ORGANIZED HEALTH CARE EDUCATION/TRAINING PROGRAM

## 2024-07-20 RX ORDER — TACROLIMUS 1 MG/1
1 CAPSULE ORAL
Status: DISCONTINUED | OUTPATIENT
Start: 2024-07-26 | End: 2024-07-21 | Stop reason: HOSPADM

## 2024-07-20 RX ORDER — TACROLIMUS 1 MG/1
1 CAPSULE ORAL
Status: DISCONTINUED | OUTPATIENT
Start: 2024-07-22 | End: 2024-07-21 | Stop reason: HOSPADM

## 2024-07-20 RX ORDER — TACROLIMUS 1 MG/1
1 CAPSULE ORAL
Status: DISCONTINUED | OUTPATIENT
Start: 2024-07-24 | End: 2024-07-21 | Stop reason: HOSPADM

## 2024-07-20 RX ADMIN — PIPERACILLIN SODIUM AND TAZOBACTAM SODIUM 4.5 G: 4; .5 INJECTION, POWDER, FOR SOLUTION INTRAVENOUS at 10:07

## 2024-07-20 RX ADMIN — HEPARIN SODIUM 5000 UNITS: 5000 INJECTION INTRAVENOUS; SUBCUTANEOUS at 02:07

## 2024-07-20 RX ADMIN — HEPARIN SODIUM 5000 UNITS: 5000 INJECTION INTRAVENOUS; SUBCUTANEOUS at 06:07

## 2024-07-20 RX ADMIN — HYDROCODONE BITARTRATE AND ACETAMINOPHEN 1 TABLET: 10; 325 TABLET ORAL at 08:07

## 2024-07-20 RX ADMIN — POTASSIUM CHLORIDE 40 MEQ: 7.46 INJECTION, SOLUTION INTRAVENOUS at 09:07

## 2024-07-20 RX ADMIN — HYDROCODONE BITARTRATE AND ACETAMINOPHEN 1 TABLET: 10; 325 TABLET ORAL at 10:07

## 2024-07-20 RX ADMIN — PANTOPRAZOLE SODIUM 40 MG: 40 INJECTION, POWDER, FOR SOLUTION INTRAVENOUS at 08:07

## 2024-07-20 RX ADMIN — HYDROMORPHONE HYDROCHLORIDE 0.5 MG: 1 INJECTION, SOLUTION INTRAMUSCULAR; INTRAVENOUS; SUBCUTANEOUS at 08:07

## 2024-07-20 RX ADMIN — HYDROCODONE BITARTRATE AND ACETAMINOPHEN 1 TABLET: 10; 325 TABLET ORAL at 01:07

## 2024-07-20 RX ADMIN — TIOTROPIUM BROMIDE INHALATION SPRAY 2 PUFF: 3.12 SPRAY, METERED RESPIRATORY (INHALATION) at 07:07

## 2024-07-20 RX ADMIN — SODIUM CHLORIDE: 9 INJECTION, SOLUTION INTRAVENOUS at 03:07

## 2024-07-20 RX ADMIN — HEPARIN SODIUM 5000 UNITS: 5000 INJECTION INTRAVENOUS; SUBCUTANEOUS at 10:07

## 2024-07-20 RX ADMIN — PIPERACILLIN SODIUM AND TAZOBACTAM SODIUM 4.5 G: 4; .5 INJECTION, POWDER, FOR SOLUTION INTRAVENOUS at 06:07

## 2024-07-20 RX ADMIN — PIPERACILLIN SODIUM AND TAZOBACTAM SODIUM 4.5 G: 4; .5 INJECTION, POWDER, FOR SOLUTION INTRAVENOUS at 02:07

## 2024-07-20 RX ADMIN — HYDROCODONE BITARTRATE AND ACETAMINOPHEN 1 TABLET: 10; 325 TABLET ORAL at 05:07

## 2024-07-20 RX ADMIN — HYDROMORPHONE HYDROCHLORIDE 0.5 MG: 1 INJECTION, SOLUTION INTRAMUSCULAR; INTRAVENOUS; SUBCUTANEOUS at 06:07

## 2024-07-20 NOTE — NURSING TRANSFER
Nursing Transfer Note      7/20/2024   12:00 PM    Nurse giving handoff: RACHEL Urbano   Nurse receiving handoff:RACHEL Miguel    Reason patient is being transferred: stepdown    Transfer To: 509    Transfer via wheelchair    Transfer with cardiac monitoring    Transported by RACHEL Urbano; FERNANDO Rosas    Telemetry: Box Number [id number]  Order for Tele Monitor? Yes    Medicines sent: inhalers, IVF, IV potassium      Patient belongings transferred with patient: Yes    Chart send with patient: Yes    Notified: spouse    Upon arrival to floor: cardiac monitor applied, patient oriented to room, call bell in reach, and bed in lowest position

## 2024-07-20 NOTE — NURSING
Nurses Note -- 4 Eyes      7/20/2024   3:28 PM      Skin assessed during: Q Shift Change      [x] No Altered Skin Integrity Present    []Prevention Measures Documented      [] Yes- Altered Skin Integrity Present or Discovered   [] LDA Added if Not in Epic (Describe Wound)   [] New Altered Skin Integrity was Present on Admit and Documented in LDA   [] Wound Image Taken    Wound Care Consulted? No    Attending Nurse: Myriam adkins lpn  Second RN/Staff Member:   Bettye RAMOS

## 2024-07-20 NOTE — ASSESSMENT & PLAN NOTE
68 year old male for whom Hepatology is consulted for IS management.  He has a history of treated HCV related cirrhosis status post liver transplant 8/2001 at [facility] currently on tacrolimus 1 mg every MWF (recently established with Hepatology in  4/2023) transferred from OSH for SBO. Hepatology consulted for assistance with IS. CT chest, abdomen and pelvis without contrast showed progressive pneumobilia, density in the posterior right lobe of the liver (3 cm) with air bubbles suggestive of developing phlegmon/hepatic abscess, low-grade partial or intermittent small bowel obstruction and mild sigmoid colitis. Labs on admission notable for white blood cell count of 15, Cr 2.0, Na 129, INR 1.4, T bili 1.3, AST/ALT 65 and 79, tacro level less than 2.  He was started on Zosyn. His partial small-bowel obstruction resolved and his diet was advanced to clears. Tacro level as of 7/20, 2.5 and LFTs have downtrended.     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 No longer needs sublingual as his diet has been advanced.   -CMP daily.

## 2024-07-20 NOTE — CARE UPDATE
"Called to see patient for fluid overload. Went to bedside where patient was resting comfortably with no difficulty breathing. Wife and daughter expressed concern that the patient appears swollen in his arms, legs, and abdomen. They report that he looks "swollen". Wife states that his rubber bracelet looks tight on his arm, which she reports was not present earlier in the day. She believes that he is too "wet".    PE:  Physical Exam  Vitals (Normotensive and  HR WNL) reviewed.   Constitutional:       General: He is not in acute distress.  Cardiovascular:      Heart sounds: Normal heart sounds.   Pulmonary:      Effort: Pulmonary effort is normal. No respiratory distress.      Breath sounds: Normal breath sounds. No wheezing.      Comments: No crackles b/l on auscultation  Abdominal:      General: There is no distension.      Palpations: Abdomen is soft.      Tenderness: There is no abdominal tenderness. There is no guarding.   Musculoskeletal:      Comments: +1 pitting edema of b/l lower extremities. No edema of bilateral upper extremities, but they do appear swollen. No change to capillary refill.   Neurological:      General: No focal deficit present.      Mental Status: He is alert.   Psychiatric:         Mood and Affect: Mood normal.        Patient is on CLD and is drinking liquids. I dc'ed fluids for tonight. Patient may benefit from lasix tomorrow per primary team. Continue monitoring for now. If any difficulty breathing, if patient needs oxygen, or has any form of decompsenation, re-evaluate asap.    Alannah Damian, DO  PGY-1  "

## 2024-07-20 NOTE — NURSING
"Patient transferred for Sicu to room 509 via wheelchair assisted to chair .. Alert oriented x 4 , PICC line with 3 ports  to right upper arm with Potassium riders and ns at 125 ml / hr . Patient c/o of pain paged and notified resident on call . Patient b/p was 97/57 . Medication ordered for pain is hydrocodone-acetaminophen  mg . Resident stated, " I will come up and see him" patient voided 300 cc in urinal ..  "

## 2024-07-20 NOTE — PLAN OF CARE
SICU PLAN OF CARE    Dx: SBO (small bowel obstruction)    Goals of Care: MAP >65, SpO2 >88%    Vital Signs (last 12 hours):   Temp:  [98.2 °F (36.8 °C)-98.5 °F (36.9 °C)]   Pulse:  []   Resp:  [15-28]   BP: ()/(52-71)   SpO2:  [90 %-94 %]   Arterial Line BP: ()/(39-56)      Neuro: AAOx4, Follows commands , and Moves all extremities spontaneously     Cardiac: sinus tachycardia     Respiratory: Room Air    Gtts: MIVF    Urine Output: Voids Spontaneously  1,350 mL/shift    Diet: Clear Liquid      Labs/Accuchecks: daily labs     Skin:  All skin remains free from injury. Patient turned q2h, bony prominences protected, and mattress inflated/working correctly.     Shift Events:  1L of LR given this shift. Plan of care ongoing, VSS, bed in lowest position, side rails x2, call light within reach.  See flowsheet for further assessment/details.  Family updated on current condition/plan of care, questions answered, and emotional support provided.  MD updated on current condition, vitals, labs, and gtts.

## 2024-07-20 NOTE — SUBJECTIVE & OBJECTIVE
Past Medical History:   Diagnosis Date    Afib     GERD (gastroesophageal reflux disease)     Hypertension        Past Surgical History:   Procedure Laterality Date    LIVER TRANSPLANT         Review of patient's allergies indicates:   Allergen Reactions    Gabapentin      Family History    None       Tobacco Use    Smoking status: Never    Smokeless tobacco: Never   Substance and Sexual Activity    Alcohol use: Not on file    Drug use: Not on file    Sexual activity: Not on file     Review of Systems   Constitutional:  Negative for chills and fever.   Gastrointestinal:  Negative for abdominal pain.     Objective:     Vital Signs (Most Recent):  Temp: 98 °F (36.7 °C) (07/20/24 1101)  Pulse: 88 (07/20/24 1101)  Resp: 20 (07/20/24 1101)  BP: 112/67 (07/20/24 1000)  SpO2: 95 % (07/20/24 1101) Vital Signs (24h Range):  Temp:  [98 °F (36.7 °C)-98.5 °F (36.9 °C)] 98 °F (36.7 °C)  Pulse:  [] 88  Resp:  [14-34] 20  SpO2:  [87 %-95 %] 95 %  BP: ()/(57-75) 112/67  Arterial Line BP: (105-121)/(50-56) 121/54     Weight: 97.1 kg (214 lb) (07/18/24 1330)  Body mass index is 28.23 kg/m².      Intake/Output Summary (Last 24 hours) at 7/20/2024 1124  Last data filed at 7/20/2024 1118  Gross per 24 hour   Intake 3933.27 ml   Output 1450 ml   Net 2483.27 ml       Lines/Drains/Airways       Peripherally Inserted Central Catheter Line  Duration             PICC Triple Lumen right cephalic -- days              Peripheral Intravenous Line  Duration                  Peripheral IV - Single Lumen 07/18/24 18 G Right Antecubital 2 days         Peripheral IV - Single Lumen 07/18/24 20 G Left Antecubital 2 days                     Physical Exam  Vitals reviewed.   Constitutional:       General: He is not in acute distress.     Appearance: He is not ill-appearing.   HENT:      Head: Normocephalic.   Eyes:      Extraocular Movements: Extraocular movements intact.   Pulmonary:      Effort: Pulmonary effort is normal.   Neurological:       Mental Status: He is alert. Mental status is at baseline.          Significant Labs:  All pertinent lab results from the last 24 hours have been reviewed.

## 2024-07-20 NOTE — NURSING TRANSFER
Nursing Transfer Note      7/19/2024   9:45 PM    Nurse giving handoff: DELMY Silva RN  Nurse receiving handoff:RACHEL Vega    Reason patient is being transferred: Step-down    [facility] [id number] [facility] [id number]    Transfer via bed    Transfer with cardiac monitoring    Transported by RN x2    Transfer Vital Signs:  Blood Pressure:124/75   Heart Rate:102  O2:94  Temperature:98.5  Respirations:20    4eyes on Skin: yes    Medicines sent: PRN breathing treatment meds    Any special needs or follow-up needed: none    Patient belongings transferred with patient: Yes    Chart send with patient: Yes    Notified: spouse, walking with patient    Patient reassessed at: 0950 07/19     Upon arrival to floor: cardiac monitor applied, patient oriented to room, call bell in reach, and bed in lowest position

## 2024-07-20 NOTE — HPI
Calixto Navarro is a 68 year old male for whom Hepatology is consulted for IS management.  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     Initially presented to outside hospital with complaints of nausea, vomiting and abdominal pain. CT chest, abdomen and pelvis without contrast showed progressive pneumobilia, density in the posterior right lobe of the liver (3 cm) with air bubbles suggestive of developing phlegmon/hepatic abscess, low-grade partial or intermittent small bowel obstruction and mild sigmoid colitis.  He was ultimately transferred to [facility] on 7/18 and admitted to the SICU for further workup.  Labs on admission notable for white blood cell count of 15, Cr 2.0, Na 129, INR 1.4, T bili 1.3, AST/ALT 65 and 79, tacro level less than 2.  He was started on Zosyn. His partial small-bowel obstruction resolved and his diet was advanced to clears. Tacro level as of 7/20, 2.5 and LFTs have downtrended.

## 2024-07-20 NOTE — ASSESSMENT & PLAN NOTE
68 year old male with h/o ESLD s/p liver transplant in 2001, A fib s/p Watchman procedure (not currently on anticoagulation), GERD, HTN who was transferred here from OSH with 2 days of abdominal pain, concern for SBO. Hypotensive on arrival with concerning CT a/p, however, abdominal exam is reassuring and patient volume down on exam. Improving overall, labs reassuring, clinical picture reassuring.    - OK for diet  - serial abdominal exams  - Broad spectrum abx (zosyn)  - daily labs  - KTM transplant for immunosuppression medication in setting of liver transplant  - Home meds reviewed and reconciled. Not taking amio or eliquis. Metoprolol converted to IV   - Dvt ppx (SCDs and heparin)  - Gi ppx (PPI)  - Remainder of care per SICU  - Please contact general surgery with any questions, concerns, or clinical status changes    Dispo: OK for floor

## 2024-07-20 NOTE — PROGRESS NOTES
[facility] - [facility]ensive [facility]  General Surgery  Progress Note    Subjective:     History of Present Illness:  Patient is a 68 year old male with h/o ESLD s/p liver transplant in 2001, A fib s/p Watchman procedure (not currently on anticoagulation), GERD, HTN who was transferred here from OSH with 2 days of abdominal pain. He reports diffuse abdominal pain that worsened since onset with associated nausea and vomiting. Had diarrhea earlier in the week after miralax then developed constipation, bloating, and obstipation last night. Denies fever, chills, CP, SOB, and all other symptoms.     He presented to OSH ED and was found to be hypotensive. Given 2L NS and started on abx.   WBC 10.3 > 16  Lactic acid at OSH 2.3 > 2.5. Repeat here pending  CT a/p with concerning for SBO with evidence of pneumatosis and pneumobilia.     Upon eval in the ED, he is afebrile. Levo 0.2 via peripheral line on cuff pressures with MAPs in 80s. Denies abdominal pain and nausea.   NGT with ~150cc in bedside cannister    Post-Op Info:  * No surgery found *         Interval History:   Patient stepped down to PCU  Feeling well  Out of bed in chair this morning  Denies abdominal pain, nausea  Tolerating diet  Passing gas and having bowel movements  Ambulating     No current facility-administered medications on file prior to encounter.     Current Outpatient Medications on File Prior to Encounter   Medication Sig    amitriptyline (ELAVIL) 75 MG tablet Take by mouth.    amLODIPine (NORVASC) 5 MG tablet Take by mouth.    fluticasone propionate (FLONASE) 50 mcg/actuation nasal spray by Each Nostril route.    HYDROcodone-acetaminophen (NORCO)  mg per tablet Take by mouth.    metoprolol succinate (TOPROL-XL) 25 MG 24 hr tablet Take by mouth.    pantoprazole (PROTONIX) 40 MG tablet Take 40 mg by mouth once daily.    pravastatin (PRAVACHOL) 20 MG tablet Take 20 mg by mouth once daily.    tacrolimus (PROGRAF) 1 MG Cap Take 1 mg by mouth.        Review of patient's allergies indicates:   Allergen Reactions    Gabapentin        Past Medical History:   Diagnosis Date    Afib     GERD (gastroesophageal reflux disease)     Hypertension      Past Surgical History:   Procedure Laterality Date    LIVER TRANSPLANT       Family History    None       Tobacco Use    Smoking status: Never    Smokeless tobacco: Never   Substance and Sexual Activity    Alcohol use: Not on file    Drug use: Not on file    Sexual activity: Not on file     Review of Systems   Constitutional:  Negative for chills and fever.   Respiratory:  Negative for cough and shortness of breath.    Cardiovascular:  Negative for chest pain.   Gastrointestinal:  Negative for abdominal pain, constipation, diarrhea, nausea and vomiting.   Genitourinary:  Negative for dysuria and hematuria.   Musculoskeletal:  Negative for back pain and myalgias.   Skin:  Negative for rash.   Neurological:  Negative for dizziness and headaches.   Psychiatric/Behavioral:  Negative for confusion. The patient is not nervous/anxious.    All other systems reviewed and are negative.    Objective:     Vital Signs (Most Recent):  Temp: 98.1 °F (36.7 °C) (07/20/24 0710)  Pulse: 80 (07/20/24 0900)  Resp: 19 (07/20/24 0900)  BP: 117/65 (07/20/24 0800)  SpO2: (!) 94 % (07/20/24 0900) Vital Signs (24h Range):  Temp:  [98.1 °F (36.7 °C)-98.5 °F (36.9 °C)] 98.1 °F (36.7 °C)  Pulse:  [] 80  Resp:  [14-34] 19  SpO2:  [87 %-95 %] 94 %  BP: ()/(57-75) 117/65  Arterial Line BP: ()/(50-56) 121/54     Weight: 97.1 kg (214 lb)  Body mass index is 28.23 kg/m².     Physical Exam  Vitals and nursing note reviewed.   Constitutional:       General: He is not in acute distress.     Appearance: He is not ill-appearing or diaphoretic.   HENT:      Head: Normocephalic and atraumatic.      Mouth/Throat:      Mouth: Mucous membranes are dry.      Pharynx: Oropharynx is clear.   Eyes:      Extraocular Movements: Extraocular movements  intact.      Conjunctiva/sclera: Conjunctivae normal.   Cardiovascular:      Rate and Rhythm: Tachycardia present.   Pulmonary:      Effort: Pulmonary effort is normal. No respiratory distress.   Abdominal:      Palpations: Abdomen is soft.      Tenderness: There is no guarding or rebound.      Comments: Well healed surgical scar noted  Distended with some firmness but no TTP to light or deep palpation. No peritonitic signs    Musculoskeletal:         General: No deformity.   Skin:     General: Skin is warm and dry.      Coloration: Skin is not jaundiced.   Neurological:      Mental Status: He is alert and oriented to person, place, and time.            I have reviewed all pertinent lab results within the past 24 hours.    Significant Diagnostics:  I have reviewed all pertinent imaging results/findings within the past 24 hours.    Assessment/Plan:     * SBO (small bowel obstruction)  68 year old male with h/o ESLD s/p liver transplant in 2001, A fib s/p Watchman procedure (not currently on anticoagulation), GERD, HTN who was transferred here from OSH with 2 days of abdominal pain, concern for SBO. Hypotensive on arrival with concerning CT a/p, however, abdominal exam is reassuring and patient volume down on exam. Improving overall, labs reassuring, clinical picture reassuring.    - OK for diet  - serial abdominal exams  - Broad spectrum abx (zosyn)  - daily labs  - KTM transplant for immunosuppression medication in setting of liver transplant  - Home meds reviewed and reconciled. Not taking amio or eliquis. Metoprolol converted to IV   - Dvt ppx (SCDs and heparin)  - Gi ppx (PPI)  - Remainder of care per SICU  - Please contact general surgery with any questions, concerns, or clinical status changes    Dispo: OK for floor         Melvi Clayton MD  General Surgery  [facility]

## 2024-07-20 NOTE — CONSULTS
[facility] - Surgical Intensive [facility]  Gastroenterology  Consult Note    Patient Name: Calixto Navarro Jr.  MRN: [id number]  Admission Date: 7/18/2024  Hospital Length of Stay: 2 days  Code Status: Full Code   Attending Provider: Reggie Guidry MD   Consulting Provider: Melina Bunch MD  Primary Care Physician: Radha, Primary Doctor  Principal Problem:SBO (small bowel obstruction)    Inpatient consult to Hepatology  Consult performed by: Melina Bunch MD  Consult ordered by: Brittany Alvarenga MD        Subjective:     HPI:  Calixto Navarro is a 68 year old male for whom Hepatology is consulted for IS management.  He has a history of treated HCV related cirrhosis status post liver transplant 8/2001 at [facility] currently on tacrolimus 1 mg every MWF (recently established with Hepatology in  4/2023.     Initially presented to outside hospital with complaints of nausea, vomiting and abdominal pain. CT chest, abdomen and pelvis without contrast showed progressive pneumobilia, density in the posterior right lobe of the liver (3 cm) with air bubbles suggestive of developing phlegmon/hepatic abscess, low-grade partial or intermittent small bowel obstruction and mild sigmoid colitis.  He was ultimately transferred to [facility] on 7/18 and admitted to the SICU for further workup.  Labs on admission notable for white blood cell count of 15, Cr 2.0, Na 129, INR 1.4, T bili 1.3, AST/ALT 65 and 79, tacro level less than 2.  He was started on Zosyn. His partial small-bowel obstruction resolved and his diet was advanced to clears. Tacro level as of 7/20, 2.5 and LFTs have downtrended.    Past Medical History:   Diagnosis Date    Afib     GERD (gastroesophageal reflux disease)     Hypertension        Past Surgical History:   Procedure Laterality Date    LIVER TRANSPLANT         Review of patient's allergies indicates:   Allergen Reactions    Gabapentin      Family History    None       Tobacco Use    Smoking status: Never    Smokeless  tobacco: Never   Substance and Sexual Activity    Alcohol use: Not on file    Drug use: Not on file    Sexual activity: Not on file     Review of Systems   Constitutional:  Negative for chills and fever.   Gastrointestinal:  Negative for abdominal pain.     Objective:     Vital Signs (Most Recent):  Temp: 98 °F (36.7 °C) (07/20/24 1101)  Pulse: 88 (07/20/24 1101)  Resp: 20 (07/20/24 1101)  BP: 112/67 (07/20/24 1000)  SpO2: 95 % (07/20/24 1101) Vital Signs (24h Range):  Temp:  [98 °F (36.7 °C)-98.5 °F (36.9 °C)] 98 °F (36.7 °C)  Pulse:  [] 88  Resp:  [14-34] 20  SpO2:  [87 %-95 %] 95 %  BP: ()/(57-75) 112/67  Arterial Line BP: (105-121)/(50-56) 121/54     Weight: 97.1 kg (214 lb) (07/18/24 1330)  Body mass index is 28.23 kg/m².      Intake/Output Summary (Last 24 hours) at 7/20/2024 1124  Last data filed at 7/20/2024 1118  Gross per 24 hour   Intake 3933.27 ml   Output 1450 ml   Net 2483.27 ml       Lines/Drains/Airways       Peripherally Inserted Central Catheter Line  Duration             PICC Triple Lumen right cephalic -- days              Peripheral Intravenous Line  Duration                  Peripheral IV - Single Lumen 07/18/24 18 G Right Antecubital 2 days         Peripheral IV - Single Lumen 07/18/24 20 G Left Antecubital 2 days                     Physical Exam  Vitals reviewed.   Constitutional:       General: He is not in acute distress.     Appearance: He is not ill-appearing.   HENT:      Head: Normocephalic.   Eyes:      Extraocular Movements: Extraocular movements intact.   Pulmonary:      Effort: Pulmonary effort is normal.   Neurological:      Mental Status: He is alert. Mental status is at baseline.          Significant Labs:  All pertinent lab results from the last 24 hours have been reviewed.    Assessment/Plan:     GI  S/P liver transplant  68 year old male for whom Hepatology is consulted for IS management.  He has a history of treated HCV related cirrhosis status post liver  transplant 8/2001 at [facility] currently on tacrolimus 1 mg every MWF (recently established with Hepatology in  4/2023) transferred from OSH for SBO. Hepatology consulted for assistance with IS. CT chest, abdomen and pelvis without contrast showed progressive pneumobilia, density in the posterior right lobe of the liver (3 cm) with air bubbles suggestive of developing phlegmon/hepatic abscess, low-grade partial or intermittent small bowel obstruction and mild sigmoid colitis. Labs on admission notable for white blood cell count of 15, Cr 2.0, Na 129, INR 1.4, T bili 1.3, AST/ALT 65 and 79, tacro level less than 2.  He was started on Zosyn. His partial small-bowel obstruction resolved and his diet was advanced to clears. Tacro level as of 7/20, 2.5 and LFTs have downtrended.     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 No longer needs sublingual as his diet has been advanced.   -CMP daily.       Thank you for your consult. I will follow-up with patient. Please contact us if you have any additional questions.    Melina Bunch MD  Gastroenterology Fellow, PGY-V  [facility]

## 2024-07-20 NOTE — SUBJECTIVE & OBJECTIVE
Interval History:   Patient stepped down to PCU  Feeling well  Out of bed in chair this morning  Denies abdominal pain, nausea  Tolerating diet  Passing gas and having bowel movements  Ambulating     No current facility-administered medications on file prior to encounter.     Current Outpatient Medications on File Prior to Encounter   Medication Sig    amitriptyline (ELAVIL) 75 MG tablet Take by mouth.    amLODIPine (NORVASC) 5 MG tablet Take by mouth.    fluticasone propionate (FLONASE) 50 mcg/actuation nasal spray by Each Nostril route.    HYDROcodone-acetaminophen (NORCO)  mg per tablet Take by mouth.    metoprolol succinate (TOPROL-XL) 25 MG 24 hr tablet Take by mouth.    pantoprazole (PROTONIX) 40 MG tablet Take 40 mg by mouth once daily.    pravastatin (PRAVACHOL) 20 MG tablet Take 20 mg by mouth once daily.    tacrolimus (PROGRAF) 1 MG Cap Take 1 mg by mouth.       Review of patient's allergies indicates:   Allergen Reactions    Gabapentin        Past Medical History:   Diagnosis Date    Afib     GERD (gastroesophageal reflux disease)     Hypertension      Past Surgical History:   Procedure Laterality Date    LIVER TRANSPLANT       Family History    None       Tobacco Use    Smoking status: Never    Smokeless tobacco: Never   Substance and Sexual Activity    Alcohol use: Not on file    Drug use: Not on file    Sexual activity: Not on file     Review of Systems   Constitutional:  Negative for chills and fever.   Respiratory:  Negative for cough and shortness of breath.    Cardiovascular:  Negative for chest pain.   Gastrointestinal:  Negative for abdominal pain, constipation, diarrhea, nausea and vomiting.   Genitourinary:  Negative for dysuria and hematuria.   Musculoskeletal:  Negative for back pain and myalgias.   Skin:  Negative for rash.   Neurological:  Negative for dizziness and headaches.   Psychiatric/Behavioral:  Negative for confusion. The patient is not nervous/anxious.    All other systems  reviewed and are negative.    Objective:     Vital Signs (Most Recent):  Temp: 98.1 °F (36.7 °C) (07/20/24 0710)  Pulse: 80 (07/20/24 0900)  Resp: 19 (07/20/24 0900)  BP: 117/65 (07/20/24 0800)  SpO2: (!) 94 % (07/20/24 0900) Vital Signs (24h Range):  Temp:  [98.1 °F (36.7 °C)-98.5 °F (36.9 °C)] 98.1 °F (36.7 °C)  Pulse:  [] 80  Resp:  [14-34] 19  SpO2:  [87 %-95 %] 94 %  BP: ()/(57-75) 117/65  Arterial Line BP: ()/(50-56) 121/54     Weight: 97.1 kg (214 lb)  Body mass index is 28.23 kg/m².     Physical Exam  Vitals and nursing note reviewed.   Constitutional:       General: He is not in acute distress.     Appearance: He is not ill-appearing or diaphoretic.   HENT:      Head: Normocephalic and atraumatic.      Mouth/Throat:      Mouth: Mucous membranes are dry.      Pharynx: Oropharynx is clear.   Eyes:      Extraocular Movements: Extraocular movements intact.      Conjunctiva/sclera: Conjunctivae normal.   Cardiovascular:      Rate and Rhythm: Tachycardia present.   Pulmonary:      Effort: Pulmonary effort is normal. No respiratory distress.   Abdominal:      Palpations: Abdomen is soft.      Tenderness: There is no guarding or rebound.      Comments: Well healed surgical scar noted  Distended with some firmness but no TTP to light or deep palpation. No peritonitic signs    Musculoskeletal:         General: No deformity.   Skin:     General: Skin is warm and dry.      Coloration: Skin is not jaundiced.   Neurological:      Mental Status: He is alert and oriented to person, place, and time.            I have reviewed all pertinent lab results within the past 24 hours.    Significant Diagnostics:  I have reviewed all pertinent imaging results/findings within the past 24 hours.

## 2024-07-21 VITALS
OXYGEN SATURATION: 92 % | RESPIRATION RATE: 18 BRPM | BODY MASS INDEX: 28.36 KG/M2 | TEMPERATURE: 98 F | SYSTOLIC BLOOD PRESSURE: 124 MMHG | HEART RATE: 87 BPM | HEIGHT: 73 IN | DIASTOLIC BLOOD PRESSURE: 68 MMHG | WEIGHT: 214 LBS

## 2024-07-21 LAB
ALBUMIN SERPL BCP-MCNC: 1.9 G/DL (ref 3.5–5.2)
ALP SERPL-CCNC: 186 U/L (ref 55–135)
ALT SERPL W/O P-5'-P-CCNC: 34 U/L (ref 10–44)
ANION GAP SERPL CALC-SCNC: 8 MMOL/L (ref 8–16)
AST SERPL-CCNC: 24 U/L (ref 10–40)
BASOPHILS # BLD AUTO: 0.05 K/UL (ref 0–0.2)
BASOPHILS NFR BLD: 0.5 % (ref 0–1.9)
BILIRUB SERPL-MCNC: 0.5 MG/DL (ref 0.1–1)
BUN SERPL-MCNC: 12 MG/DL (ref 8–23)
CALCIUM SERPL-MCNC: 8.4 MG/DL (ref 8.7–10.5)
CHLORIDE SERPL-SCNC: 104 MMOL/L (ref 95–110)
CO2 SERPL-SCNC: 25 MMOL/L (ref 23–29)
CREAT SERPL-MCNC: 0.8 MG/DL (ref 0.5–1.4)
DIFFERENTIAL METHOD BLD: ABNORMAL
EOSINOPHIL # BLD AUTO: 0.2 K/UL (ref 0–0.5)
EOSINOPHIL NFR BLD: 2.5 % (ref 0–8)
ERYTHROCYTE [DISTWIDTH] IN BLOOD BY AUTOMATED COUNT: 14.9 % (ref 11.5–14.5)
EST. GFR  (NO RACE VARIABLE): >60 ML/MIN/1.73 M^2
GLUCOSE SERPL-MCNC: 90 MG/DL (ref 70–110)
HCT VFR BLD AUTO: 28.4 % (ref 40–54)
HGB BLD-MCNC: 9.5 G/DL (ref 14–18)
IMM GRANULOCYTES # BLD AUTO: 0.15 K/UL (ref 0–0.04)
IMM GRANULOCYTES NFR BLD AUTO: 1.6 % (ref 0–0.5)
LYMPHOCYTES # BLD AUTO: 1.1 K/UL (ref 1–4.8)
LYMPHOCYTES NFR BLD: 11.6 % (ref 18–48)
MAGNESIUM SERPL-MCNC: 1.7 MG/DL (ref 1.6–2.6)
MCH RBC QN AUTO: 32.5 PG (ref 27–31)
MCHC RBC AUTO-ENTMCNC: 33.5 G/DL (ref 32–36)
MCV RBC AUTO: 97 FL (ref 82–98)
MONOCYTES # BLD AUTO: 1.3 K/UL (ref 0.3–1)
MONOCYTES NFR BLD: 13.5 % (ref 4–15)
NEUTROPHILS # BLD AUTO: 6.8 K/UL (ref 1.8–7.7)
NEUTROPHILS NFR BLD: 70.3 % (ref 38–73)
NRBC BLD-RTO: 0 /100 WBC
PHOSPHATE SERPL-MCNC: 4.4 MG/DL (ref 2.7–4.5)
PLATELET # BLD AUTO: 93 K/UL (ref 150–450)
PMV BLD AUTO: 10.6 FL (ref 9.2–12.9)
POCT GLUCOSE: 86 MG/DL (ref 70–110)
POTASSIUM SERPL-SCNC: 4 MMOL/L (ref 3.5–5.1)
PROT SERPL-MCNC: 5.8 G/DL (ref 6–8.4)
RBC # BLD AUTO: 2.92 M/UL (ref 4.6–6.2)
SODIUM SERPL-SCNC: 137 MMOL/L (ref 136–145)
TACROLIMUS BLD-MCNC: 2.1 NG/ML (ref 5–15)
WBC # BLD AUTO: 9.6 K/UL (ref 3.9–12.7)

## 2024-07-21 PROCEDURE — 83735 ASSAY OF MAGNESIUM: CPT | Performed by: STUDENT IN AN ORGANIZED HEALTH CARE EDUCATION/TRAINING PROGRAM

## 2024-07-21 PROCEDURE — 63600175 PHARM REV CODE 636 W HCPCS

## 2024-07-21 PROCEDURE — 80197 ASSAY OF TACROLIMUS: CPT | Performed by: STUDENT IN AN ORGANIZED HEALTH CARE EDUCATION/TRAINING PROGRAM

## 2024-07-21 PROCEDURE — 84100 ASSAY OF PHOSPHORUS: CPT | Performed by: STUDENT IN AN ORGANIZED HEALTH CARE EDUCATION/TRAINING PROGRAM

## 2024-07-21 PROCEDURE — 25000003 PHARM REV CODE 250

## 2024-07-21 PROCEDURE — 85025 COMPLETE CBC W/AUTO DIFF WBC: CPT | Performed by: STUDENT IN AN ORGANIZED HEALTH CARE EDUCATION/TRAINING PROGRAM

## 2024-07-21 PROCEDURE — 80053 COMPREHEN METABOLIC PANEL: CPT | Performed by: STUDENT IN AN ORGANIZED HEALTH CARE EDUCATION/TRAINING PROGRAM

## 2024-07-21 PROCEDURE — 63600175 PHARM REV CODE 636 W HCPCS: Performed by: STUDENT IN AN ORGANIZED HEALTH CARE EDUCATION/TRAINING PROGRAM

## 2024-07-21 PROCEDURE — 25000003 PHARM REV CODE 250: Performed by: STUDENT IN AN ORGANIZED HEALTH CARE EDUCATION/TRAINING PROGRAM

## 2024-07-21 RX ORDER — METOPROLOL TARTRATE 25 MG/1
12.5 TABLET ORAL 2 TIMES DAILY
Status: DISCONTINUED | OUTPATIENT
Start: 2024-07-21 | End: 2024-07-21 | Stop reason: HOSPADM

## 2024-07-21 RX ORDER — PRAVASTATIN SODIUM 10 MG/1
20 TABLET ORAL DAILY
Status: DISCONTINUED | OUTPATIENT
Start: 2024-07-21 | End: 2024-07-21 | Stop reason: HOSPADM

## 2024-07-21 RX ORDER — PANTOPRAZOLE SODIUM 40 MG/1
40 TABLET, DELAYED RELEASE ORAL DAILY
Status: DISCONTINUED | OUTPATIENT
Start: 2024-07-21 | End: 2024-07-21 | Stop reason: HOSPADM

## 2024-07-21 RX ADMIN — HYDROMORPHONE HYDROCHLORIDE 0.5 MG: 1 INJECTION, SOLUTION INTRAMUSCULAR; INTRAVENOUS; SUBCUTANEOUS at 04:07

## 2024-07-21 RX ADMIN — HYDROMORPHONE HYDROCHLORIDE 0.5 MG: 1 INJECTION, SOLUTION INTRAMUSCULAR; INTRAVENOUS; SUBCUTANEOUS at 12:07

## 2024-07-21 RX ADMIN — HYDROCODONE BITARTRATE AND ACETAMINOPHEN 1 TABLET: 10; 325 TABLET ORAL at 02:07

## 2024-07-21 RX ADMIN — HYDROCODONE BITARTRATE AND ACETAMINOPHEN 1 TABLET: 10; 325 TABLET ORAL at 10:07

## 2024-07-21 RX ADMIN — PIPERACILLIN SODIUM AND TAZOBACTAM SODIUM 4.5 G: 4; .5 INJECTION, POWDER, FOR SOLUTION INTRAVENOUS at 06:07

## 2024-07-21 RX ADMIN — HEPARIN SODIUM 5000 UNITS: 5000 INJECTION INTRAVENOUS; SUBCUTANEOUS at 06:07

## 2024-07-21 RX ADMIN — HYDROMORPHONE HYDROCHLORIDE 0.5 MG: 1 INJECTION, SOLUTION INTRAMUSCULAR; INTRAVENOUS; SUBCUTANEOUS at 08:07

## 2024-07-21 RX ADMIN — HYDROCODONE BITARTRATE AND ACETAMINOPHEN 1 TABLET: 10; 325 TABLET ORAL at 06:07

## 2024-07-21 RX ADMIN — PANTOPRAZOLE SODIUM 40 MG: 40 INJECTION, POWDER, FOR SOLUTION INTRAVENOUS at 08:07

## 2024-07-21 RX ADMIN — PANTOPRAZOLE SODIUM 40 MG: 40 TABLET, DELAYED RELEASE ORAL at 12:07

## 2024-07-21 NOTE — PLAN OF CARE
Problem: Adult Inpatient Plan of Care  Goal: Optimal Comfort and Wellbeing  Outcome: Progressing     Problem: Skin Injury Risk Increased  Goal: Skin Health and Integrity  Outcome: Progressing     Problem: Pain Acute  Goal: Optimal Pain Control and Function  Outcome: Progressing

## 2024-07-21 NOTE — NURSING
Nurses Note -- 4 Eyes      7/21/2024   12:54 PM      Skin assessed during: Q Shift Change      [x] No Altered Skin Integrity Present    []Prevention Measures Documented      [] Yes- Altered Skin Integrity Present or Discovered   [] LDA Added if Not in Epic (Describe Wound)   [] New Altered Skin Integrity was Present on Admit and Documented in LDA   [] Wound Image Taken    Wound Care Consulted? No    Attending Nurse:  Myriam Soto lpn    Second RN/Staff Member:   Olga RAMOS

## 2024-07-21 NOTE — TREATMENT PLAN
Hepatology Treatment Plan    Calixto Navarro Jr. is a 68 y.o. male admitted to hospital 7/18/2024 (Hospital Day: 4) due to SBO (small bowel obstruction).     Objective  Temp:  [97.6 °F (36.4 °C)-99.2 °F (37.3 °C)] 97.8 °F (36.6 °C) (07/21 1149)  Pulse:  [] 87 (07/21 1149)  BP: (101-138)/(58-78) 124/68 (07/21 1149)  Resp:  [16-18] 18 (07/21 1149)  SpO2:  [88 %-94 %] 92 % (07/21 1149)    Laboratory    Recent Labs   Lab 07/19/24  0353 07/20/24  0618 07/21/24  0642   HGB 11.0* 10.6*  10.6* 9.5*       Lab Results   Component Value Date    WBC 9.60 07/21/2024    HGB 9.5 (L) 07/21/2024    HCT 28.4 (L) 07/21/2024    MCV 97 07/21/2024    PLT 93 (L) 07/21/2024       Lab Results   Component Value Date     07/21/2024    K 4.0 07/21/2024     07/21/2024    CO2 25 07/21/2024    BUN 12 07/21/2024    CREATININE 0.8 07/21/2024    CALCIUM 8.4 (L) 07/21/2024    ANIONGAP 8 07/21/2024    ESTGFRAFRICA 83 07/09/2024       Lab Results   Component Value Date    ALT 34 07/21/2024    AST 24 07/21/2024    ALKPHOS 186 (H) 07/21/2024    BILITOT 0.5 07/21/2024       Lab Results   Component Value Date    INR 1.4 (H) 07/18/2024     Assessment/Plan:    68 year old male for whom Hepatology is consulted for IS management. He has a history of treated HCV related cirrhosis status post liver transplant 8/2001 at [facility] currently on tacrolimus 1 mg every MWF (recently established with Hepatology in  4/2023) transferred from OSH for SBO. Hepatology consulted for assistance with IS. CT chest, abdomen and pelvis without contrast showed progressive pneumobilia, density in the posterior right lobe of the liver (3 cm) with air bubbles suggestive of developing phlegmon/hepatic abscess, low-grade partial or intermittent small bowel obstruction and mild sigmoid colitis. Labs on admission notable for white blood cell count of 15, Cr 2.0, Na 129, INR 1.4, T bili 1.3, AST/ALT 65 and 79, tacro level less than 2. He was started on Zosyn. His  partial small-bowel obstruction resolved and his diet was advanced to clears. Tacro level as of 7/20, 2.5 and LFTs have downtrended.      Problem List:  Partial SBO, resolved  HCV related cirrhosis status post liver transplant 8/2001  ALMA DELIA, resolved  Leukocytosis, resolved with IV abx  Positive blood cx from 7/18, GPR     Recommendations:  -Primary team discharging today. Blood cx positive with GPR from 7/18; have not yet speciated. Repeat blood cx have not been ordered. Would obtain repeats, especially given he is immunosuppressed.   -Continue Tacrolimus home dose 1 mg every MWF with daily AM tacro levels.   -CMP daily.   -We will continue to follow.    Thank you for involving us in the care of Calixto Navarro Jr.. Please call with any additional questions, concerns or changes in the patient's clinical status.    Melina Bunch MD  Gastroenterology Fellow, PGY-V

## 2024-07-21 NOTE — NURSING
Pt discharged to home waiting for transport . Patient receivedcopy of discharged papers instructions. Pt denies pain at this time. Pt educated on signs and symptoms of when to call the doctor. All belongings with the patient . Pt verbalized understanding.

## 2024-07-21 NOTE — PLAN OF CARE
[facility] - [facility]  Discharge Final Note    Primary Care Provider: Radha, Primary Doctor    Expected Discharge Date: 7/21/2024    Final Discharge Note (most recent)       Final Note - 07/21/24 1616          Final Note    Assessment Type Final Discharge Note     Anticipated Discharge Disposition Home or Self Care     What phone number can be called within the next 1-3 days to see how you are doing after discharge? [phone]        Post-Acute Status    Coverage MEDICARE/MEDICARE PART A & B --- MEDICARE SUPPLEMENT [place]BS OF [place]     Discharge Delays None known at this time                     Important Message from Medicare             Contact Info       No, Primary Doctor   Relationship: PCP - General        Next Steps: Schedule an appointment as soon as possible for a visit in 2 week(s)          Pt discharged home.    Hien Candelario LMSW  Case Management [facility] [place]  [phone]

## 2024-07-21 NOTE — SUBJECTIVE & OBJECTIVE
Interval History: ***    Medications:  Continuous Infusions:  Scheduled Meds:   heparin (porcine)  5,000 Units Subcutaneous Q8H    pantoprazole  40 mg Intravenous BID    piperacillin-tazobactam (Zosyn) IV (PEDS and ADULTS) (extended infusion is not appropriate)  4.5 g Intravenous Q8H    [START ON 7/22/2024] tacrolimus  1 mg Oral Q7 Days    And    [START ON 7/24/2024] tacrolimus  1 mg Oral Q7 Days    And    [START ON 7/26/2024] tacrolimus  1 mg Oral Q7 Days     PRN Meds:  Current Facility-Administered Medications:     calcium gluconate IVPB, 1 g, Intravenous, PRN    calcium gluconate IVPB, 2 g, Intravenous, PRN    calcium gluconate IVPB, 3 g, Intravenous, PRN    fluticasone furoate-vilanteroL, 1 puff, Inhalation, Daily PRN    HYDROcodone-acetaminophen, 1 tablet, Oral, Q4H PRN    HYDROmorphone, 0.5 mg, Intravenous, Q4H PRN    LIDOcaine (PF) 10 mg/ml (1%), 1 mL, Intradermal, Once PRN    magnesium sulfate IVPB, 2 g, Intravenous, PRN    magnesium sulfate IVPB, 4 g, Intravenous, PRN    metoprolol, 5 mg, Intravenous, Q6H PRN    ondansetron, 4 mg, Intravenous, Q8H PRN    potassium chloride, 40 mEq, Intravenous, PRN **AND** potassium chloride, 60 mEq, Intravenous, PRN **AND** potassium chloride, 80 mEq, Intravenous, PRN    sodium chloride 0.9%, 10 mL, Intravenous, PRN    sodium chloride 0.9%, 10 mL, Intravenous, PRN    sodium phosphate 15 mmol in D5W 250 mL IVPB, 15 mmol, Intravenous, PRN    sodium phosphate 20.01 mmol in D5W 250 mL IVPB, 20.01 mmol, Intravenous, PRN    sodium phosphate 30 mmol in D5W 250 mL IVPB, 30 mmol, Intravenous, PRN    tiotropium bromide, 2 puff, Inhalation, Daily PRN     Review of patient's allergies indicates:   Allergen Reactions    Gabapentin      Objective:     Vital Signs (Most Recent):  Temp: 98.2 °F (36.8 °C) (07/21/24 0721)  Pulse: 85 (07/21/24 0721)  Resp: 16 (07/21/24 0816)  BP: 138/63 (07/21/24 0721)  SpO2: (!) 92 % (07/21/24 0721) Vital Signs (24h Range):  Temp:  [96.3 °F (35.7 °C)-99.2  "°F (37.3 °C)] 98.2 °F (36.8 °C)  Pulse:  [] 85  Resp:  [16-27] 16  SpO2:  [88 %-95 %] 92 %  BP: ()/(57-78) 138/63     Weight: 97.1 kg (214 lb)  Body mass index is 28.23 kg/m².    Intake/Output - Last 3 Shifts         07/19 0700 07/20 0659 07/20 0700 07/21 0659 07/21 0700 07/22 0659    P.O. 240 684     I.V. (mL/kg) 3045.5 (31.4) 617.4 (6.4)     IV Piggyback 1296.2 259.1     Total Intake(mL/kg) 4581.8 (47.2) 1560.4 (16.1)     Urine (mL/kg/hr) 2800 (1.2) 975 (0.4)     Total Output 2800 975     Net +1781.8 +585.4                     Physical Exam     Significant Labs:  {Results:04070735::"I have reviewed all pertinent lab results within the past 24 hours."}    Significant Diagnostics:  {Imaging Review:52587::"I have reviewed all pertinent imaging results/findings within the past 24 hours."}  "

## 2024-07-21 NOTE — DISCHARGE SUMMARY
[facility]  DISCHARGE SUMMARY  General Surgery      Admit Date:  7/18/2024    Discharge Date:  7/21/2024      Attending Physician:  Reggie Guidry MD     Discharge Provider:  Melvi Clayton MD     Reason for Admission:  SBO (small bowel obstruction)     Procedures Performed:  * No surgery found *    History of Present Illness: Patient is a 68 year old male with h/o ESLD s/p liver transplant in 2001 (with 3 takebacks, currently on tacro 1mg MWF), A fib s/p Watchman procedure (not currently on anticoagulation), GERD, HTN who was transferred here from OSH with 2 days of abdominal pain. He reports diffuse abdominal pain that worsened since onset with associated nausea and vomiting. Had diarrhea earlier in the week after miralax then developed constipation, bloating, and obstipation last night. Denies fever, chills, CP, SOB, and all other symptoms.     Hospital Course:  Patient was admitted from the ER to the SICU due to pressor requirements. With fluid resuscitation he was quickly weaned from pressors and improved clinically. In the ED he began to pass gas and eventually had return of bowel function after NGT decompression. He denied any further abdominal pain after his arrival. We discussed strict return precautions.     Labs and vital signs remained stable and appropriate throughout course.  Diet was advanced as tolerated and the patient's pain was controlled on oral pain medications without problem.  Currently, the patient is doing well and is stable and appropriate for discharge at this time.      Consults:  SICU, liver txp.    Physical Exam  Constitutional:       General: He is not in acute distress.     Appearance: Normal appearance. He is not ill-appearing or diaphoretic.   HENT:      Head: Normocephalic and atraumatic.   Eyes:      Pupils: Pupils are equal, round, and reactive to light.   Cardiovascular:      Rate and Rhythm: Normal rate.   Pulmonary:      Effort: Pulmonary effort is normal. No  "respiratory distress.   Abdominal:      General: Abdomen is flat. There is no distension.      Palpations: Abdomen is soft. There is no mass.      Tenderness: There is no abdominal tenderness. There is no guarding or rebound.      Hernia: No hernia is present.   Musculoskeletal:         General: Normal range of motion.      Cervical back: Normal range of motion.   Skin:     General: Skin is warm and dry.   Neurological:      General: No focal deficit present.      Mental Status: He is alert and oriented to person, place, and time.   Psychiatric:         Mood and Affect: Mood normal.         Behavior: Behavior normal.           Significant Diagnostic Studies:   Recent Labs   Lab 07/19/24  0353 07/20/24 0618 07/21/24  0642   WBC 12.28 12.63  12.63 9.60   HGB 11.0* 10.6*  10.6* 9.5*   HCT 32.8* 32.2*  32.2* 28.4*   PLT 91* 93*  93* 93*     Recent Labs   Lab 07/18/24  1737 07/19/24 0353 07/20/24 0618 07/21/24  0642   * 133* 138  138 137   K 5.1 4.5 3.9  3.9 4.0   CL 98 100 106  106 104   CO2 18* 22* 24  24 25   BUN 41* 40* 24*  24* 12   CREATININE 2.0* 1.7* 1.1  1.1 0.8    89 91  91 90   CALCIUM 8.5* 8.6* 8.6*  8.6* 8.4*   CAION 1.02*  --   --   --    MG 1.4* 1.5* 1.9 1.7   PHOS 4.0 3.5 3.8 4.4   AST 65* 52* 29  29 24   ALT 79* 63* 43  43 34   ALKPHOS 216* 189* 188*  188* 186*   BILITOT 1.3* 0.9 0.5  0.5 0.5   PROT 5.9* 5.5* 5.6*  5.6* 5.8*   ALBUMIN 2.1* 1.9* 1.8*  1.8* 1.9*     Recent Labs   Lab 07/18/24  1737   INR 1.4*   LACTATE 1.9   No results for input(s): "PH", "PCO2", "PO2", "HCO3" in the last 72 hours.      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       Discharged Condition:  Good    Disposition: home with family    Follow Up/Patient Instructions:     Medications:  Reconciled Home Medications:    Current Discharge Medication List        CONTINUE these medications which have NOT CHANGED    Details   amitriptyline (ELAVIL) 75 MG tablet Take by mouth.      amLODIPine (NORVASC) 5 MG tablet Take by mouth.      fluticasone propionate (FLONASE) 50 mcg/actuation nasal spray by Each Nostril route.      HYDROcodone-acetaminophen (NORCO)  mg per tablet Take by mouth.      metoprolol succinate (TOPROL-XL) 25 MG 24 hr tablet Take by mouth.      pantoprazole (PROTONIX) 40 MG tablet Take 40 mg by mouth once daily.      pravastatin (PRAVACHOL) 20 MG tablet Take 20 mg by mouth once daily.      tacrolimus (PROGRAF) 1 MG Cap Take 1 mg by mouth.           Discharge Procedure Orders   Notify your health care provider if you experience any of the following:  temperature >100.4     Notify your health care provider if you experience any of the following:  persistent nausea and vomiting or diarrhea     Notify your health care provider if you experience any of the following:  severe uncontrolled pain     Notify your health care provider if you experience any of the following:  difficulty breathing or increased cough     Activity as tolerated      Follow-up Information       No, Primary Doctor. Schedule an appointment as soon as possible for a visit in 2 week(s).                             No future appointments.    Melvi Clayton MD

## 2024-07-22 LAB
BACTERIA BLD CULT: ABNORMAL

## 2024-07-23 LAB — BACTERIA BLD CULT: NORMAL

## 2024-07-24 ENCOUNTER — PATIENT MESSAGE (OUTPATIENT)
Dept: HEPATOLOGY | Facility: CLINIC | Age: 69
End: 2024-07-24
Payer: MEDICARE